# Patient Record
Sex: FEMALE | Race: BLACK OR AFRICAN AMERICAN | Employment: UNEMPLOYED | URBAN - METROPOLITAN AREA
[De-identification: names, ages, dates, MRNs, and addresses within clinical notes are randomized per-mention and may not be internally consistent; named-entity substitution may affect disease eponyms.]

---

## 2017-02-17 ENCOUNTER — APPOINTMENT (OUTPATIENT)
Dept: CT IMAGING | Age: 47
End: 2017-02-17
Attending: EMERGENCY MEDICINE
Payer: SELF-PAY

## 2017-02-17 ENCOUNTER — APPOINTMENT (OUTPATIENT)
Dept: GENERAL RADIOLOGY | Age: 47
End: 2017-02-17
Attending: EMERGENCY MEDICINE
Payer: SELF-PAY

## 2017-02-17 ENCOUNTER — HOSPITAL ENCOUNTER (EMERGENCY)
Age: 47
Discharge: HOME | End: 2017-02-17
Attending: EMERGENCY MEDICINE
Payer: SELF-PAY

## 2017-02-17 VITALS
DIASTOLIC BLOOD PRESSURE: 99 MMHG | TEMPERATURE: 98.5 F | SYSTOLIC BLOOD PRESSURE: 169 MMHG | RESPIRATION RATE: 16 BRPM | OXYGEN SATURATION: 99 % | HEART RATE: 80 BPM

## 2017-02-17 DIAGNOSIS — N28.9 RENAL INSUFFICIENCY: ICD-10-CM

## 2017-02-17 DIAGNOSIS — I10 HTN (HYPERTENSION): ICD-10-CM

## 2017-02-17 DIAGNOSIS — R80.9 PROTEINURIA: ICD-10-CM

## 2017-02-17 DIAGNOSIS — F11.10 HEROIN ABUSE (HCC): ICD-10-CM

## 2017-02-17 DIAGNOSIS — R51.9 NONINTRACTABLE HEADACHE, UNSPECIFIED CHRONICITY PATTERN, UNSPECIFIED HEADACHE TYPE: ICD-10-CM

## 2017-02-17 DIAGNOSIS — I10 ESSENTIAL HYPERTENSION: Primary | ICD-10-CM

## 2017-02-17 DIAGNOSIS — F14.10 COCAINE ABUSE (HCC): ICD-10-CM

## 2017-02-17 LAB
ALBUMIN SERPL BCP-MCNC: 3.1 G/DL (ref 3.4–5)
ALBUMIN/GLOB SERPL: 0.8 {RATIO} (ref 0.8–1.7)
ALP SERPL-CCNC: 112 U/L (ref 45–117)
ALT SERPL-CCNC: 23 U/L (ref 13–56)
AMPHET UR QL SCN: NEGATIVE
ANION GAP BLD CALC-SCNC: 6 MMOL/L (ref 3–18)
APPEARANCE UR: CLEAR
AST SERPL W P-5'-P-CCNC: 25 U/L (ref 15–37)
BACTERIA URNS QL MICRO: ABNORMAL /HPF
BARBITURATES UR QL SCN: NEGATIVE
BASOPHILS # BLD AUTO: 0 K/UL (ref 0–0.1)
BASOPHILS # BLD: 1 % (ref 0–2)
BENZODIAZ UR QL: NEGATIVE
BILIRUB SERPL-MCNC: 0.2 MG/DL (ref 0.2–1)
BILIRUB UR QL: NEGATIVE
BUN SERPL-MCNC: 22 MG/DL (ref 7–18)
BUN/CREAT SERPL: 16 (ref 12–20)
CALCIUM SERPL-MCNC: 8.4 MG/DL (ref 8.5–10.1)
CANNABINOIDS UR QL SCN: NEGATIVE
CHLORIDE SERPL-SCNC: 106 MMOL/L (ref 100–108)
CK MB CFR SERPL CALC: 1.1 % (ref 0–4)
CK MB SERPL-MCNC: 1.4 NG/ML (ref 0.5–3.6)
CK SERPL-CCNC: 124 U/L (ref 26–192)
CO2 SERPL-SCNC: 30 MMOL/L (ref 21–32)
COCAINE UR QL SCN: POSITIVE
COLOR UR: YELLOW
CREAT SERPL-MCNC: 1.41 MG/DL (ref 0.6–1.3)
DIFFERENTIAL METHOD BLD: ABNORMAL
EOSINOPHIL # BLD: 0.2 K/UL (ref 0–0.4)
EOSINOPHIL NFR BLD: 3 % (ref 0–5)
EPITH CASTS URNS QL MICRO: ABNORMAL /LPF (ref 0–5)
ERYTHROCYTE [DISTWIDTH] IN BLOOD BY AUTOMATED COUNT: 15.3 % (ref 11.6–14.5)
ETHANOL SERPL-MCNC: <3 MG/DL (ref 0–3)
GLOBULIN SER CALC-MCNC: 3.8 G/DL (ref 2–4)
GLUCOSE SERPL-MCNC: 94 MG/DL (ref 74–99)
GLUCOSE UR STRIP.AUTO-MCNC: NEGATIVE MG/DL
HCT VFR BLD AUTO: 29.6 % (ref 35–45)
HDSCOM,HDSCOM: ABNORMAL
HGB BLD-MCNC: 9.8 G/DL (ref 12–16)
HGB UR QL STRIP: NEGATIVE
KETONES UR QL STRIP.AUTO: NEGATIVE MG/DL
LEUKOCYTE ESTERASE UR QL STRIP.AUTO: NEGATIVE
LYMPHOCYTES # BLD AUTO: 22 % (ref 21–52)
LYMPHOCYTES # BLD: 1.1 K/UL (ref 0.9–3.6)
MAGNESIUM SERPL-MCNC: 1.9 MG/DL (ref 1.8–2.4)
MCH RBC QN AUTO: 29.3 PG (ref 24–34)
MCHC RBC AUTO-ENTMCNC: 33.1 G/DL (ref 31–37)
MCV RBC AUTO: 88.6 FL (ref 74–97)
METHADONE UR QL: NEGATIVE
MONOCYTES # BLD: 0.5 K/UL (ref 0.05–1.2)
MONOCYTES NFR BLD AUTO: 10 % (ref 3–10)
NEUTS SEG # BLD: 3.2 K/UL (ref 1.8–8)
NEUTS SEG NFR BLD AUTO: 64 % (ref 40–73)
NITRITE UR QL STRIP.AUTO: NEGATIVE
OPIATES UR QL: POSITIVE
PCP UR QL: NEGATIVE
PH UR STRIP: 7 [PH] (ref 5–8)
PLATELET # BLD AUTO: 144 K/UL (ref 135–420)
PMV BLD AUTO: 10 FL (ref 9.2–11.8)
POTASSIUM SERPL-SCNC: 3.2 MMOL/L (ref 3.5–5.5)
PROT SERPL-MCNC: 6.9 G/DL (ref 6.4–8.2)
PROT UR STRIP-MCNC: 30 MG/DL
RBC # BLD AUTO: 3.34 M/UL (ref 4.2–5.3)
RBC #/AREA URNS HPF: ABNORMAL /HPF (ref 0–5)
SODIUM SERPL-SCNC: 142 MMOL/L (ref 136–145)
SP GR UR REFRACTOMETRY: 1.01 (ref 1–1.03)
TROPONIN I SERPL-MCNC: <0.02 NG/ML (ref 0–0.04)
UROBILINOGEN UR QL STRIP.AUTO: 0.2 EU/DL (ref 0.2–1)
WBC # BLD AUTO: 5 K/UL (ref 4.6–13.2)
WBC URNS QL MICRO: ABNORMAL /HPF (ref 0–4)

## 2017-02-17 PROCEDURE — 74011250636 HC RX REV CODE- 250/636: Performed by: EMERGENCY MEDICINE

## 2017-02-17 PROCEDURE — 80307 DRUG TEST PRSMV CHEM ANLYZR: CPT | Performed by: EMERGENCY MEDICINE

## 2017-02-17 PROCEDURE — 74011250637 HC RX REV CODE- 250/637: Performed by: EMERGENCY MEDICINE

## 2017-02-17 PROCEDURE — 71010 XR CHEST PORT: CPT

## 2017-02-17 PROCEDURE — 82550 ASSAY OF CK (CPK): CPT | Performed by: EMERGENCY MEDICINE

## 2017-02-17 PROCEDURE — 83735 ASSAY OF MAGNESIUM: CPT | Performed by: EMERGENCY MEDICINE

## 2017-02-17 PROCEDURE — 70450 CT HEAD/BRAIN W/O DYE: CPT

## 2017-02-17 PROCEDURE — 96361 HYDRATE IV INFUSION ADD-ON: CPT

## 2017-02-17 PROCEDURE — 80053 COMPREHEN METABOLIC PANEL: CPT | Performed by: EMERGENCY MEDICINE

## 2017-02-17 PROCEDURE — 85025 COMPLETE CBC W/AUTO DIFF WBC: CPT | Performed by: EMERGENCY MEDICINE

## 2017-02-17 PROCEDURE — 99285 EMERGENCY DEPT VISIT HI MDM: CPT

## 2017-02-17 PROCEDURE — 81001 URINALYSIS AUTO W/SCOPE: CPT | Performed by: EMERGENCY MEDICINE

## 2017-02-17 PROCEDURE — 96374 THER/PROPH/DIAG INJ IV PUSH: CPT

## 2017-02-17 PROCEDURE — 93005 ELECTROCARDIOGRAM TRACING: CPT

## 2017-02-17 RX ORDER — HYDRALAZINE HYDROCHLORIDE 20 MG/ML
20 INJECTION INTRAMUSCULAR; INTRAVENOUS ONCE
Status: COMPLETED | OUTPATIENT
Start: 2017-02-17 | End: 2017-02-17

## 2017-02-17 RX ORDER — ACETAMINOPHEN 325 MG/1
325 TABLET ORAL
Status: COMPLETED | OUTPATIENT
Start: 2017-02-17 | End: 2017-02-17

## 2017-02-17 RX ORDER — AMLODIPINE BESYLATE 10 MG/1
10 TABLET ORAL
Status: COMPLETED | OUTPATIENT
Start: 2017-02-17 | End: 2017-02-17

## 2017-02-17 RX ORDER — HYDRALAZINE HYDROCHLORIDE 25 MG/1
25 TABLET, FILM COATED ORAL 3 TIMES DAILY
Qty: 90 TAB | Refills: 0 | Status: SHIPPED | OUTPATIENT
Start: 2017-02-17 | End: 2017-02-17

## 2017-02-17 RX ORDER — METOPROLOL TARTRATE 50 MG/1
50 TABLET ORAL 2 TIMES DAILY
COMMUNITY
End: 2017-08-16 | Stop reason: SDUPTHER

## 2017-02-17 RX ORDER — HYDRALAZINE HYDROCHLORIDE 25 MG/1
25 TABLET, FILM COATED ORAL
Status: COMPLETED | OUTPATIENT
Start: 2017-02-17 | End: 2017-02-17

## 2017-02-17 RX ORDER — HYDRALAZINE HYDROCHLORIDE 50 MG/1
50 TABLET, FILM COATED ORAL 3 TIMES DAILY
Qty: 90 TAB | Refills: 0 | Status: SHIPPED | OUTPATIENT
Start: 2017-02-17 | End: 2017-03-19

## 2017-02-17 RX ORDER — LISINOPRIL 20 MG/1
20 TABLET ORAL DAILY
COMMUNITY
End: 2017-08-16 | Stop reason: SDUPTHER

## 2017-02-17 RX ORDER — AMLODIPINE BESYLATE 10 MG/1
10 TABLET ORAL DAILY
Qty: 30 TAB | Refills: 0 | Status: SHIPPED | OUTPATIENT
Start: 2017-02-17 | End: 2017-03-19

## 2017-02-17 RX ORDER — AMLODIPINE BESYLATE 10 MG/1
10 TABLET ORAL DAILY
COMMUNITY
End: 2017-08-16 | Stop reason: SDUPTHER

## 2017-02-17 RX ORDER — ACETAMINOPHEN 325 MG/1
650 TABLET ORAL
Status: COMPLETED | OUTPATIENT
Start: 2017-02-17 | End: 2017-02-17

## 2017-02-17 RX ADMIN — HYDRALAZINE HYDROCHLORIDE 25 MG: 25 TABLET, FILM COATED ORAL at 06:14

## 2017-02-17 RX ADMIN — HYDRALAZINE HYDROCHLORIDE 25 MG: 25 TABLET, FILM COATED ORAL at 04:45

## 2017-02-17 RX ADMIN — HYDRALAZINE HYDROCHLORIDE 20 MG: 20 INJECTION INTRAMUSCULAR; INTRAVENOUS at 03:07

## 2017-02-17 RX ADMIN — ACETAMINOPHEN 325 MG: 325 TABLET, FILM COATED ORAL at 06:26

## 2017-02-17 RX ADMIN — AMLODIPINE BESYLATE 10 MG: 10 TABLET ORAL at 03:06

## 2017-02-17 RX ADMIN — SODIUM CHLORIDE 500 ML: 900 INJECTION, SOLUTION INTRAVENOUS at 03:06

## 2017-02-17 RX ADMIN — ACETAMINOPHEN 650 MG: 325 TABLET, FILM COATED ORAL at 03:07

## 2017-02-17 NOTE — ED NOTES
Bedside report received from [facility], pt lying on stretcher, pt c/o headache, no distress noted at this time.

## 2017-02-17 NOTE — ED PROVIDER NOTES
HPI Comments: 2:44 AM Honey JuddKarthik Vázquez is a 50 y.o. female with a history of hypertension, hypercholesteremia, and IV heroin and cocaine abuse who presents to the ED complaining of headache and \"just not feeling good\" starting tonight after using cocaine and heroin. She notes that she is followed by [facility] and is supposed to be on blood pressure medications but she has been out of her lisinopril and norvasc for the past 2 weeks. She denies any nausea or vomiting, no numbness or weakness, no neck or back pain, no change in vision or gait. She denies any history of CVA. She denies any associated chest pain or palpitations, no dyspnea. No other acute symptoms or complaints were noted. Past Medical History:   Diagnosis Date    Hypercholesterolemia     Hyperlipidemia     Hypertension     Other ill-defined conditions(799.89)      sinus    Other ill-defined conditions(799.89)      hypercholesteral       Past Surgical History:   Procedure Laterality Date    Hx hysterectomy           Family History:   Problem Relation Age of Onset    Diabetes Mother     Diabetes Brother        Social History     Social History    Marital status: SINGLE     Spouse name: N/A    Number of children: N/A    Years of education: N/A     Occupational History    Not on file. Social History Main Topics    Smoking status: Current Every Day Smoker     Packs/day: 0.50     Types: Cigarettes    Smokeless tobacco: Never Used    Alcohol use No    Drug use: 5.00 per week     Special: Heroin    Sexual activity: No     Other Topics Concern    Not on file     Social History Narrative         ALLERGIES: Review of patient's allergies indicates no known allergies. Review of Systems   Constitutional: Positive for fatigue. Negative for chills, diaphoresis and fever. HENT: Negative for congestion, nosebleeds and sore throat. Eyes: Negative for pain. Respiratory: Negative for cough and chest tightness.     Cardiovascular: Negative for chest pain and palpitations. Gastrointestinal: Negative for abdominal pain, constipation, diarrhea, nausea and vomiting. Endocrine: Negative. Genitourinary: Negative for dysuria and flank pain. Musculoskeletal: Negative for back pain, neck pain and neck stiffness. Skin: Negative for pallor, rash and wound. Allergic/Immunologic: Negative. Neurological: Positive for light-headedness and headaches. Negative for dizziness, seizures, syncope, weakness and numbness. Hematological: Does not bruise/bleed easily. Vitals:    02/17/17 0141 02/17/17 0200 02/17/17 0230   BP: (!) 197/117 (!) 190/114 (!) 193/115   Pulse: 76 73 76   Resp: 11 10 12   Temp: 98.5 °F (36.9 °C)     SpO2: 100% 99% 99%            Physical Exam   Constitutional: She is oriented to person, place, and time. She appears well-developed and well-nourished. No distress. Resting comfortably on stretcher   HENT:   Head: Normocephalic and atraumatic. MM moist   Eyes: Conjunctivae and EOM are normal. Pupils are equal, round, and reactive to light. No scleral icterus. Sclera clear bilaterally   Neck: Normal range of motion. Neck supple. No JVD present. Non-tender to palpation. No carotid bruits auscultated. Cardiovascular: Normal rate, regular rhythm and normal heart sounds. Exam reveals no gallop and no friction rub. No murmur heard. Pulmonary/Chest: Effort normal and breath sounds normal. No respiratory distress. She has no wheezes. She has no rales. She exhibits no tenderness. No crepitance with palpation   Abdominal: Soft. She exhibits no distension. There is no tenderness. Genitourinary:   Genitourinary Comments: No CVA tenderness   Musculoskeletal: She exhibits no edema or tenderness. Normal inspection of upper extremities. No edema noted to bilateral lower extremities   Lymphadenopathy:     She has no cervical adenopathy. Neurological: She is alert and oriented to person, place, and time.  No cranial nerve deficit. She exhibits normal muscle tone. Coordination normal.   Normal motor and sensation bilaterally to upper and lower extremities. Gait WNL. Skin: Skin is warm and dry. No rash noted. She is not diaphoretic. Psychiatric:   Normal mood and affect. Vitals reviewed. MDM  Number of Diagnoses or Management Options  Cocaine abuse:   Essential hypertension:   Heroin abuse:   Nonintractable headache, unspecified chronicity pattern, unspecified headache type:   Renal insufficiency:   Diagnosis management comments: Pt with general malaise, mild headache and HTN after cocaine/heroin use and blood pressure medication non-compliance. No acute distress appreciated. Neuro exam is WNL. Will check labs, EKG, CXR, CT head and treat HTN. Reassess. Pt with improved headache and BP. Steady gait in ED. Will d/c to home with PMD follow up. ACEI discontinued due to renal dysfunction, Beta blocker discontinued due to cocaine abuse. Amount and/or Complexity of Data Reviewed  Clinical lab tests: ordered  Tests in the radiology section of CPT®: ordered  Tests in the medicine section of CPT®: ordered and reviewed  Decide to obtain previous medical records or to obtain history from someone other than the patient: yes  Obtain history from someone other than the patient: yes  Independent visualization of images, tracings, or specimens: yes      ED Course       Procedures    EKG:  NSr, rate 77, normal axis, no significant ST-T abnormalities. CXR:  No acute pulmonary process     Scribe Attestation  Raffaele Ribeiro scribing for and in the presence of Jefferson Jay MD  02/17/17. Signed by: Kingston Doyle 02/17/17, 2:50 AM    Physician Attestation  I personally performed the services described in the documentation, reviewed the documentation, as recorded by the scribe in my presence, and it accurately and completely records my words and actions.     Jefferson Jya MD 02/17/17

## 2017-02-17 NOTE — ED NOTES
Pt arrived to the ED by EMS with co HA following cocaine and IV heroin abuse approx 1 hour ago. Pt states she does heroin IV daily but denies regular cocaine use. Hx HTN. Pt takes lisinopril 20, amlodipine 20, and metoprolol 50 BIDbut has been out of her medications x 1 month.

## 2017-02-17 NOTE — DISCHARGE INSTRUCTIONS
DASH Diet: Care Instructions  Your Care Instructions  The DASH diet is an eating plan that can help lower your blood pressure. DASH stands for Dietary Approaches to Stop Hypertension. Hypertension is high blood pressure. The DASH diet focuses on eating foods that are high in calcium, potassium, and magnesium. These nutrients can lower blood pressure. The foods that are highest in these nutrients are fruits, vegetables, low-fat dairy products, nuts, seeds, and legumes. But taking calcium, potassium, and magnesium supplements instead of eating foods that are high in those nutrients does not have the same effect. The DASH diet also includes whole grains, fish, and poultry. The DASH diet is one of several lifestyle changes your doctor may recommend to lower your high blood pressure. Your doctor may also want you to decrease the amount of sodium in your diet. Lowering sodium while following the DASH diet can lower blood pressure even further than just the DASH diet alone. Follow-up care is a key part of your treatment and safety. Be sure to make and go to all appointments, and call your doctor if you are having problems. It's also a good idea to know your test results and keep a list of the medicines you take. How can you care for yourself at home? Following the DASH diet  · Eat 4 to 5 servings of fruit each day. A serving is 1 medium-sized piece of fruit, ½ cup chopped or canned fruit, 1/4 cup dried fruit, or 4 ounces (½ cup) of fruit juice. Choose fruit more often than fruit juice. · Eat 4 to 5 servings of vegetables each day. A serving is 1 cup of lettuce or raw leafy vegetables, ½ cup of chopped or cooked vegetables, or 4 ounces (½ cup) of vegetable juice. Choose vegetables more often than vegetable juice. · Get 2 to 3 servings of low-fat and fat-free dairy each day. A serving is 8 ounces of milk, 1 cup of yogurt, or 1 ½ ounces of cheese. · Eat 6 to 8 servings of grains each day.  A serving is 1 slice of bread, 1 ounce of dry cereal, or ½ cup of cooked rice, pasta, or cooked cereal. Try to choose whole-grain products as much as possible. · Limit lean meat, poultry, and fish to 2 servings each day. A serving is 3 ounces, about the size of a deck of cards. · Eat 4 to 5 servings of nuts, seeds, and legumes (cooked dried beans, lentils, and split peas) each week. A serving is 1/3 cup of nuts, 2 tablespoons of seeds, or ½ cup of cooked beans or peas. · Limit fats and oils to 2 to 3 servings each day. A serving is 1 teaspoon of vegetable oil or 2 tablespoons of salad dressing. · Limit sweets and added sugars to 5 servings or less a week. A serving is 1 tablespoon jelly or jam, ½ cup sorbet, or 1 cup of lemonade. · Eat less than 2,300 milligrams (mg) of sodium a day. If you limit your sodium to 1,500 mg a day, you can lower your blood pressure even more. Tips for success  · Start small. Do not try to make dramatic changes to your diet all at once. You might feel that you are missing out on your favorite foods and then be more likely to not follow the plan. Make small changes, and stick with them. Once those changes become habit, add a few more changes. · Try some of the following:  ¨ Make it a goal to eat a fruit or vegetable at every meal and at snacks. This will make it easy to get the recommended amount of fruits and vegetables each day. ¨ Try yogurt topped with fruit and nuts for a snack or healthy dessert. ¨ Add lettuce, tomato, cucumber, and onion to sandwiches. ¨ Combine a ready-made pizza crust with low-fat mozzarella cheese and lots of vegetable toppings. Try using tomatoes, squash, spinach, broccoli, carrots, cauliflower, and onions. ¨ Have a variety of cut-up vegetables with a low-fat dip as an appetizer instead of chips and dip. ¨ Sprinkle sunflower seeds or chopped almonds over salads. Or try adding chopped walnuts or almonds to cooked vegetables. ¨ Try some vegetarian meals using beans and peas. Add garbanzo or kidney beans to salads. Make burritos and tacos with mashed dan beans or black beans. Where can you learn more? Go to http://esau-rosa.info/. Enter C613 in the search box to learn more about \"DASH Diet: Care Instructions. \"  Current as of: March 23, 2016  Content Version: 11.1  © 6611-4731 [employer]. Care instructions adapted under license by [employer] (which disclaims liability or warranty for this information). If you have questions about a medical condition or this instruction, always ask your healthcare professional. [place] [address] any warranty or liability for your use of this information. Cocaine Abuse: Care Instructions  Your Care Instructions  Using cocaine can cause physical and mental harm. It can increase your heart rate and blood pressure, which can lead to a heart attack and even death. It can raise your body temperature. You may have nausea, vomiting, and chills. If you smoke cocaine, the fumes can cause breathing problems. If you snort cocaine, it can damage your nasal passages. You may become shaky and restless. You also may see or hear things that are not there (hallucinations), or believe things that are not true. When the doctor treated you, he or she may have:  · Watched your symptoms or done tests to find out how much cocaine was in your body. · Treated you to control your heart rate, temperature, and blood pressure. · Given you oxygen to help you breathe. · Given you medicine to settle your thoughts and help keep you calm. The doctor has checked you carefully, but problems can develop later. If you notice any problems or new symptoms, get medical treatment right away. How can you care for yourself at home? · When you use cocaine regularly, your body and brain get used to it. This is called dependency. If you are dependent on this drug, you may have withdrawal symptoms when you stop using it. You may feel drowsy, have vivid dreams, or feel hungry, tired, or depressed. You may also feel confused and have trouble thinking clearly. To help get past these symptoms:  ¨ Get plenty of rest.  ¨ Drink lots of fluids. ¨ Stay active, but don't tire yourself. ¨ Eat a healthy diet. · Talk to your doctor about drug counseling programs that can help you stop using cocaine. When should you call for help? Call 911 anytime you think you may need emergency care. For example, call if:  · You have symptoms of a heart attack. These may include:  ¨ Chest pain or pressure, or a strange feeling in the chest.  ¨ Sweating. ¨ Shortness of breath. ¨ Nausea or vomiting. ¨ Pain, pressure, or a strange feeling in the back, neck, jaw, or upper belly or in one or both shoulders or arms. ¨ A fast or irregular heartbeat. After you call 911, the  may tell you to chew 1 adult-strength or 2 to 4 low-dose aspirin. Wait for an ambulance. Do not try to drive yourself. · You feel you cannot stop from hurting yourself or someone else. Call your doctor now or seek immediate medical care if:  · You have severe side effects from using cocaine. These may include problems with thinking, such as seeing things that aren't there or thinking that someone is trying to harm you (paranoia). · You have new or worse withdrawal symptoms. Watch closely for changes in your health, and be sure to contact your doctor if:  · You need more help or support to stop. Where can you learn more? Go to http://esau-rosa.info/. Enter G404 in the search box to learn more about \"Cocaine Abuse: Care Instructions. \"  Current as of: February 24, 2016  Content Version: 11.1  © 9033-0389 [employer]. Care instructions adapted under license by [employer] (which disclaims liability or warranty for this information).  If you have questions about a medical condition or this instruction, always ask your healthcare professional. [address] any warranty or liability for your use of this information. Opiate Overdose: Care Instructions  Your Care Instructions  You have had treatment to help your body recover from taking too much of an opiate. You are getting better, but you may not feel well for a while. It takes time for the medicine to leave your body. How long it takes to feel better depends on which drug you took and how much you took of it. Opiates include illegal drugs such as heroin, often called smack, junk, H, and ska. Opiates also include medicines that doctors prescribe to treat pain. These are medicines such as oxycodone, methadone, and buprenorphine. They are sometimes sold and used illegally. Taking too much of an opiate can be dangerous. It may cause:  · Trouble breathing. · Low blood pressure. · A low heart rate. · A coma. When the doctor treated you for the overdose, he or she may have:  · Watched your symptoms or done tests to find out what kind of drug you took. · Given you fluids. · Given you oxygen to help you breathe. · Given you a medicine called naloxone to help reverse the effects of the opiate. · Done several tests, including blood tests, to see how you're responding to treatment. The doctor also watched you carefully to make sure you were recovering safely. Follow-up care is a key part of your treatment and safety. Be sure to make and go to all appointments, and call your doctor if you are having problems. It's also a good idea to know your test results and keep a list of the medicines you take. How can you care for yourself at home? · If you take opiates regularly, your body gets used to them. This is called dependency. If you are dependent on this drug, you may have withdrawal symptoms when you stop taking it. These can include nausea, sweating, chills, diarrhea, stomach cramps, and muscle aches.  Withdrawal can last up to several weeks, depending on which drug you took. You may feel very ill, but you are probably not in medical danger. · Your doctor may give you medicine to help you feel better. To help get through withdrawal, you can also:  ¨ Get plenty of rest.  ¨ Drink plenty of fluids. ¨ Stay active, but don't tire yourself. ¨ Eat a healthy diet. · If you had a tube in your throat to help you breathe, you may have a sore throat or hoarseness that can last a few days. Sip liquids to help soothe your throat. · Do not drink alcohol or take illegal drugs. · Do not drive if you feel sleepy or groggy while you recover from your overdose. · Get help to stop using drugs. Talk to your doctor about drug counseling programs. When should you call for help? Call 911 anytime you think you may need emergency care. For example, call if:  · You feel you cannot stop from hurting yourself or someone else. Call your doctor now or seek immediate medical care if:  · You have new or worse withdrawal symptoms, such as:  ¨ Stomach cramps. ¨ Vomiting. ¨ Diarrhea. ¨ Muscle aches. ¨ Sweating. Watch closely for changes in your health, and be sure to contact your doctor if:  · You do not get better as expected. Where can you learn more? Go to http://esau-rosa.info/. Enter 872 [id number] in the search box to learn more about \"Opiate Overdose: Care Instructions. \"  Current as of: February 24, 2016  Content Version: 11.1  © 4809-4186 [employer]. Care instructions adapted under license by [facility] (which disclaims liability or warranty for this information). If you have questions about a medical condition or this instruction, always ask your healthcare professional. [place] [address] any warranty or liability for your use of this information. Headache: Care Instructions  Your Care Instructions    Headaches have many possible causes.  Most headaches aren't a sign of a more serious problem, and they will get better on their own. Home treatment may help you feel better faster. The doctor has checked you carefully, but problems can develop later. If you notice any problems or new symptoms, get medical treatment right away. Follow-up care is a key part of your treatment and safety. Be sure to make and go to all appointments, and call your doctor if you are having problems. It's also a good idea to know your test results and keep a list of the medicines you take. How can you care for yourself at home? · Do not drive if you have taken a prescription pain medicine. · Rest in a quiet, dark room until your headache is gone. Close your eyes and try to relax or go to sleep. Don't watch TV or read. · Put a cold, moist cloth or cold pack on the painful area for 10 to 20 minutes at a time. Put a thin cloth between the cold pack and your skin. · Use a warm, moist towel or a heating pad set on low to relax tight shoulder and neck muscles. · Have someone gently massage your neck and shoulders. · Take pain medicines exactly as directed. ¨ If the doctor gave you a prescription medicine for pain, take it as prescribed. ¨ If you are not taking a prescription pain medicine, ask your doctor if you can take an over-the-counter medicine. · Be careful not to take pain medicine more often than the instructions allow, because you may get worse or more frequent headaches when the medicine wears off. · Do not ignore new symptoms that occur with a headache, such as a fever, weakness or numbness, vision changes, or confusion. These may be signs of a more serious problem. To prevent headaches  · Keep a headache diary so you can figure out what triggers your headaches. Avoiding triggers may help you prevent headaches. Record when each headache began, how long it lasted, and what the pain was like (throbbing, aching, stabbing, or dull).  Write down any other symptoms you had with the headache, such as nausea, flashing lights or dark spots, or sensitivity to bright light or loud noise. Note if the headache occurred near your period. List anything that might have triggered the headache, such as certain foods (chocolate, cheese, wine) or odors, smoke, bright light, stress, or lack of sleep. · Find healthy ways to deal with stress. Headaches are most common during or right after stressful times. Take time to relax before and after you do something that has caused a headache in the past.  · Try to keep your muscles relaxed by keeping good posture. Check your jaw, face, neck, and shoulder muscles for tension, and try relaxing them. When sitting at a desk, change positions often, and stretch for 30 seconds each hour. · Get plenty of sleep and exercise. · Eat regularly and well. Long periods without food can trigger a headache. · Treat yourself to a massage. Some people find that regular massages are very helpful in relieving tension. · Limit caffeine by not drinking too much coffee, tea, or soda. But don't quit caffeine suddenly, because that can also give you headaches. · Reduce eyestrain from computers by blinking frequently and looking away from the computer screen every so often. Make sure you have proper eyewear and that your monitor is set up properly, about an arm's length away. · Seek help if you have depression or anxiety. Your headaches may be linked to these conditions. Treatment can both prevent headaches and help with symptoms of anxiety or depression. When should you call for help? Call 911 anytime you think you may need emergency care. For example, call if:  · You have signs of a stroke. These may include:  ¨ Sudden numbness, paralysis, or weakness in your face, arm, or leg, especially on only one side of your body. ¨ Sudden vision changes. ¨ Sudden trouble speaking. ¨ Sudden confusion or trouble understanding simple statements. ¨ Sudden problems with walking or balance.   ¨ A sudden, severe headache that is different from past headaches. Call your doctor now or seek immediate medical care if:  · You have a new or worse headache. · Your headache gets much worse. Where can you learn more? Go to http://esau-rosa.info/. Enter M271 in the search box to learn more about \"Headache: Care Instructions. \"  Current as of: February 19, 2016  Content Version: 11.1  © 8338-2762 [employer]. Care instructions adapted under license by [employer] (which disclaims liability or warranty for this information). If you have questions about a medical condition or this instruction, always ask your healthcare professional. [place] [address] any warranty or liability for your use of this information. High Blood Pressure: Care Instructions  Your Care Instructions  If your blood pressure is usually above 140/90, you have high blood pressure, or hypertension. That means the top number is 140 or higher or the bottom number is 90 or higher, or both. Despite what a lot of people think, high blood pressure usually doesn't cause headaches or make you feel dizzy or lightheaded. It usually has no symptoms. But it does increase your risk for heart attack, stroke, and kidney or eye damage. The higher your blood pressure, the more your risk increases. Your doctor will give you a goal for your blood pressure. Your goal will be based on your health and your age. An example of a goal is to keep your blood pressure below 140/90. Lifestyle changes, such as eating healthy and being active, are always important to help lower blood pressure. You might also take medicine to reach your blood pressure goal.  Follow-up care is a key part of your treatment and safety. Be sure to make and go to all appointments, and call your doctor if you are having problems. It's also a good idea to know your test results and keep a list of the medicines you take. How can you care for yourself at home?   Medical treatment  · If you stop taking your medicine, your blood pressure will go back up. You may take one or more types of medicine to lower your blood pressure. Be safe with medicines. Take your medicine exactly as prescribed. Call your doctor if you think you are having a problem with your medicine. · Talk to your doctor before you start taking aspirin every day. Aspirin can help certain people lower their risk of a heart attack or stroke. But taking aspirin isn't right for everyone, because it can cause serious bleeding. · See your doctor regularly. You may need to see the doctor more often at first or until your blood pressure comes down. · If you are taking blood pressure medicine, talk to your doctor before you take decongestants or anti-inflammatory medicine, such as ibuprofen. Some of these medicines can raise blood pressure. · Learn how to check your blood pressure at home. Lifestyle changes  · Stay at a healthy weight. This is especially important if you put on weight around the waist. Losing even 10 pounds can help you lower your blood pressure. · If your doctor recommends it, get more exercise. Walking is a good choice. Bit by bit, increase the amount you walk every day. Try for at least 30 minutes on most days of the week. You also may want to swim, bike, or do other activities. · Avoid or limit alcohol. Talk to your doctor about whether you can drink any alcohol. · Try to limit how much sodium you eat to less than 2,300 milligrams (mg) a day. Your doctor may ask you to try to eat less than 1,500 mg a day. · Eat plenty of fruits (such as bananas and oranges), vegetables, legumes, whole grains, and low-fat dairy products. · Lower the amount of saturated fat in your diet. Saturated fat is found in animal products such as milk, cheese, and meat. Limiting these foods may help you lose weight and also lower your risk for heart disease. · Do not smoke. Smoking increases your risk for heart attack and stroke.  If you need help quitting, talk to your doctor about stop-smoking programs and medicines. These can increase your chances of quitting for good. When should you call for help? Call 911 anytime you think you may need emergency care. This may mean having symptoms that suggest that your blood pressure is causing a serious heart or blood vessel problem. Your blood pressure may be over 180/110. For example, call 911 if:  · You have symptoms of a heart attack. These may include:  ¨ Chest pain or pressure, or a strange feeling in the chest.  ¨ Sweating. ¨ Shortness of breath. ¨ Nausea or vomiting. ¨ Pain, pressure, or a strange feeling in the back, neck, jaw, or upper belly or in one or both shoulders or arms. ¨ Lightheadedness or sudden weakness. ¨ A fast or irregular heartbeat. · You have symptoms of a stroke. These may include:  ¨ Sudden numbness, tingling, weakness, or loss of movement in your face, arm, or leg, especially on only one side of your body. ¨ Sudden vision changes. ¨ Sudden trouble speaking. ¨ Sudden confusion or trouble understanding simple statements. ¨ Sudden problems with walking or balance. ¨ A sudden, severe headache that is different from past headaches. · You have severe back or belly pain. Do not wait until your blood pressure comes down on its own. Get help right away. Call your doctor now or seek immediate care if:  · Your blood pressure is much higher than normal (such as 180/110 or higher), but you don't have symptoms. · You think high blood pressure is causing symptoms, such as:  ¨ Severe headache. ¨ Blurry vision. Watch closely for changes in your health, and be sure to contact your doctor if:  · Your blood pressure measures 140/90 or higher at least 2 times. That means the top number is 140 or higher or the bottom number is 90 or higher, or both. · You think you may be having side effects from your blood pressure medicine.   · Your blood pressure is usually normal, but it goes above normal at least 2 times. Where can you learn more? Go to http://esau-rosa.info/. Enter C633 in the search box to learn more about \"High Blood Pressure: Care Instructions. \"  Current as of: August 8, 2016  Content Version: 11.1  © 3984-9113 [employer]. Care instructions adapted under license by [facility] (which disclaims liability or warranty for this information). If you have questions about a medical condition or this instruction, always ask your healthcare professional. [address] any warranty or liability for your use of this information.

## 2017-02-17 NOTE — Clinical Note
Have your blood pressure rechecked by a primary care doctor this week. Do not use cocaine or heroin. Take tylenol as needed for headache, follow label instructions for dosing guidelines.

## 2017-02-18 LAB
ATRIAL RATE: 77 BPM
CALCULATED P AXIS, ECG09: 67 DEGREES
CALCULATED R AXIS, ECG10: 48 DEGREES
CALCULATED T AXIS, ECG11: 69 DEGREES
DIAGNOSIS, 93000: NORMAL
P-R INTERVAL, ECG05: 184 MS
Q-T INTERVAL, ECG07: 414 MS
QRS DURATION, ECG06: 86 MS
QTC CALCULATION (BEZET), ECG08: 468 MS
VENTRICULAR RATE, ECG03: 77 BPM

## 2017-07-18 ENCOUNTER — HOSPITAL ENCOUNTER (EMERGENCY)
Age: 47
Discharge: PSYCHIATRIC OR SUBSTANCE USE FACILITY | End: 2017-07-19
Attending: EMERGENCY MEDICINE
Payer: SELF-PAY

## 2017-07-18 DIAGNOSIS — R45.851 SUICIDAL IDEATION: Primary | ICD-10-CM

## 2017-07-18 DIAGNOSIS — R03.0 ELEVATED BLOOD PRESSURE READING: ICD-10-CM

## 2017-07-18 DIAGNOSIS — F19.90 DRUG USE: ICD-10-CM

## 2017-07-18 LAB
ALBUMIN SERPL BCP-MCNC: 3.1 G/DL (ref 3.4–5)
ALBUMIN/GLOB SERPL: 0.6 {RATIO} (ref 0.8–1.7)
ALP SERPL-CCNC: 117 U/L (ref 45–117)
ALT SERPL-CCNC: 28 U/L (ref 13–56)
AMPHET UR QL SCN: NEGATIVE
ANION GAP BLD CALC-SCNC: 6 MMOL/L (ref 3–18)
AST SERPL W P-5'-P-CCNC: 28 U/L (ref 15–37)
BARBITURATES UR QL SCN: NEGATIVE
BASOPHILS # BLD AUTO: 0 K/UL (ref 0–0.06)
BASOPHILS # BLD: 1 % (ref 0–2)
BENZODIAZ UR QL: NEGATIVE
BILIRUB SERPL-MCNC: 0.1 MG/DL (ref 0.2–1)
BUN SERPL-MCNC: 17 MG/DL (ref 7–18)
BUN/CREAT SERPL: 11 (ref 12–20)
CALCIUM SERPL-MCNC: 9.1 MG/DL (ref 8.5–10.1)
CANNABINOIDS UR QL SCN: NEGATIVE
CHLORIDE SERPL-SCNC: 102 MMOL/L (ref 100–108)
CO2 SERPL-SCNC: 31 MMOL/L (ref 21–32)
COCAINE UR QL SCN: POSITIVE
CREAT SERPL-MCNC: 1.54 MG/DL (ref 0.6–1.3)
DIFFERENTIAL METHOD BLD: ABNORMAL
EOSINOPHIL # BLD: 0 K/UL (ref 0–0.4)
EOSINOPHIL NFR BLD: 0 % (ref 0–5)
ERYTHROCYTE [DISTWIDTH] IN BLOOD BY AUTOMATED COUNT: 14.4 % (ref 11.6–14.5)
ETHANOL SERPL-MCNC: <3 MG/DL (ref 0–3)
GLOBULIN SER CALC-MCNC: 5 G/DL (ref 2–4)
GLUCOSE SERPL-MCNC: 136 MG/DL (ref 74–99)
HCT VFR BLD AUTO: 29.6 % (ref 35–45)
HDSCOM,HDSCOM: ABNORMAL
HGB BLD-MCNC: 9.7 G/DL (ref 12–16)
LYMPHOCYTES # BLD AUTO: 26 % (ref 21–52)
LYMPHOCYTES # BLD: 0.9 K/UL (ref 0.9–3.6)
MCH RBC QN AUTO: 27.8 PG (ref 24–34)
MCHC RBC AUTO-ENTMCNC: 32.8 G/DL (ref 31–37)
MCV RBC AUTO: 84.8 FL (ref 74–97)
METHADONE UR QL: NEGATIVE
MONOCYTES # BLD: 0.2 K/UL (ref 0.05–1.2)
MONOCYTES NFR BLD AUTO: 7 % (ref 3–10)
NEUTS SEG # BLD: 2.2 K/UL (ref 1.8–8)
NEUTS SEG NFR BLD AUTO: 66 % (ref 40–73)
OPIATES UR QL: POSITIVE
PCP UR QL: NEGATIVE
PLATELET # BLD AUTO: 163 K/UL (ref 135–420)
PMV BLD AUTO: 9.3 FL (ref 9.2–11.8)
POTASSIUM SERPL-SCNC: 3.1 MMOL/L (ref 3.5–5.5)
PROT SERPL-MCNC: 8.1 G/DL (ref 6.4–8.2)
RBC # BLD AUTO: 3.49 M/UL (ref 4.2–5.3)
SODIUM SERPL-SCNC: 139 MMOL/L (ref 136–145)
WBC # BLD AUTO: 3.4 K/UL (ref 4.6–13.2)

## 2017-07-18 PROCEDURE — 80307 DRUG TEST PRSMV CHEM ANLYZR: CPT | Performed by: EMERGENCY MEDICINE

## 2017-07-18 PROCEDURE — 74011250637 HC RX REV CODE- 250/637: Performed by: EMERGENCY MEDICINE

## 2017-07-18 PROCEDURE — 80053 COMPREHEN METABOLIC PANEL: CPT | Performed by: EMERGENCY MEDICINE

## 2017-07-18 PROCEDURE — 85025 COMPLETE CBC W/AUTO DIFF WBC: CPT | Performed by: EMERGENCY MEDICINE

## 2017-07-18 PROCEDURE — 99284 EMERGENCY DEPT VISIT MOD MDM: CPT

## 2017-07-18 RX ORDER — AMLODIPINE BESYLATE 10 MG/1
10 TABLET ORAL
Status: COMPLETED | OUTPATIENT
Start: 2017-07-18 | End: 2017-07-18

## 2017-07-18 RX ORDER — LOPERAMIDE HYDROCHLORIDE 2 MG/1
4 CAPSULE ORAL ONCE
Status: COMPLETED | OUTPATIENT
Start: 2017-07-18 | End: 2017-07-18

## 2017-07-18 RX ORDER — ONDANSETRON 4 MG/1
4 TABLET, ORALLY DISINTEGRATING ORAL
Status: COMPLETED | OUTPATIENT
Start: 2017-07-18 | End: 2017-07-18

## 2017-07-18 RX ORDER — CLONIDINE 0.2 MG/24H
1 PATCH, EXTENDED RELEASE TRANSDERMAL
Status: DISCONTINUED | OUTPATIENT
Start: 2017-07-18 | End: 2017-07-19 | Stop reason: HOSPADM

## 2017-07-18 RX ORDER — DIAZEPAM 5 MG/1
5 TABLET ORAL
Status: COMPLETED | OUTPATIENT
Start: 2017-07-18 | End: 2017-07-18

## 2017-07-18 RX ADMIN — AMLODIPINE BESYLATE 10 MG: 10 TABLET ORAL at 14:17

## 2017-07-18 RX ADMIN — ONDANSETRON 4 MG: 4 TABLET, ORALLY DISINTEGRATING ORAL at 14:17

## 2017-07-18 RX ADMIN — LOPERAMIDE HYDROCHLORIDE 4 MG: 2 CAPSULE ORAL at 14:17

## 2017-07-18 RX ADMIN — DIAZEPAM 5 MG: 5 TABLET ORAL at 18:36

## 2017-07-18 NOTE — ED NOTES
Bedside report received from Fili Jimenez, [facility]. Pt observed resting on stretcher in comfortable position with eyes closed. Observed rise and fall of chest.  Will continue to monitor.

## 2017-07-18 NOTE — BSMART NOTE
Comprehensive Assessment Form Part 1    Section I - Dis[facility]    [facility] I spoke with Mikhail Srinivasan about coming to evaluate this patient for possible crisis stabilization. Section II - Integrated Summary       This is a 50year old female with a history of HTN, Hypercholesterolemia and Hyperlipidemia who presented in the ED with complaints of depression and suicidal ideations. She said \"I'm tired of living. \" States she relapsed on drugs back in 2012 or 2013 after she was charged with child abuse and her child was taken away from her. She said she woke up this morning feeling  depressed and tired. She has been abusing heroin and cocaine daily. States she has never tried to hurt herself in the past but is now having thoughts of overdosing. She claims that she went to the [facility] in the past and was sent to [facility] for a 30 day program. Sleep and appetite disturbance. She denies having thoughts of wanting to harm others. When asked about hallucinations, she said \"sometimes I think I hear people calling my name. \" She is unemployed and lives with a cousin.                         Lizandro Bennett RN

## 2017-07-18 NOTE — ED NOTES
I performed a brief evaluation, including history and physical, of the patient here in triage and I have determined that pt will need further treatment and evaluation from the main side ER physician. I have placed initial orders to help in expediting patients care.      July 18, 2017 at 12:27 PM - [place], MD        Visit Vitals    BP (!) 191/123 (BP 1 Location: Left arm, BP Patient Position: At rest)    Pulse 86    Temp 98.8 °F (37.1 °C)    Resp 18    Ht 5' 8\" (1.727 m)    Wt 59 kg (130 lb)    SpO2 100%    BMI 19.77 kg/m2

## 2017-07-18 NOTE — ED PROVIDER NOTES
HPI Comments: 1:40 PM  Abigail Harvey is a 50 y.o. female presenting to the ED C/O SI (denies HI) X 1 month. States she has had similar symptoms in the past. Pt reports feeling depressed but is not currently taking medications for depression. Associated sxs include restless feeling, cold sweats, diarrhea (onset this AM), and mild nausea. Reports drug use yesterday including heroin and cocaine. Does endorse ivdu. Pt denies fever, cough, CP, HI or hallucinations and any other symptoms or complaints. Patient is a 50 y.o. female presenting with mental health disorder. The history is provided by the patient. Mental Health Problem   The primary symptoms include dysphoric mood (depression). The primary symptoms do not include delusions or hallucinations. Episode onset: X 1 month. Additional symptoms of the illness do not include no headaches or no abdominal pain. Past Medical History:   Diagnosis Date    Hypercholesterolemia     Hyperlipidemia     Hypertension     Other ill-defined conditions     sinus    Other ill-defined conditions     hypercholesteral       Past Surgical History:   Procedure Laterality Date    HX HYSTERECTOMY           Family History:   Problem Relation Age of Onset    Diabetes Mother     Diabetes Brother        Social History     Social History    Marital status: SINGLE     Spouse name: N/A    Number of children: N/A    Years of education: N/A     Occupational History    Not on file. Social History Main Topics    Smoking status: Current Every Day Smoker     Packs/day: 0.50     Types: Cigarettes    Smokeless tobacco: Never Used    Alcohol use No    Drug use: 5.00 per week     Special: Heroin    Sexual activity: No     Other Topics Concern    Not on file     Social History Narrative         ALLERGIES: Review of patient's allergies indicates no known allergies. Review of Systems   Constitutional: Negative for fever. HENT: Negative for congestion.     Eyes: Negative for visual disturbance. Respiratory: Negative for cough and shortness of breath. Cardiovascular: Negative for chest pain and leg swelling. Gastrointestinal: Positive for diarrhea. Negative for abdominal pain, nausea and vomiting. Genitourinary: Negative for dysuria. Musculoskeletal: Negative. Neurological: Negative for speech difficulty and headaches. Psychiatric/Behavioral: Positive for dysphoric mood (depression) and suicidal ideas. Negative for hallucinations. All other systems reviewed and are negative. Vitals:    07/18/17 1217 07/18/17 1417   BP: (!) 191/123 190/88   Pulse: 86 76   Resp: 18    Temp: 98.8 °F (37.1 °C)    SpO2: 100%    Weight: 59 kg (130 lb)    Height: 5' 8\" (1.727 m)             Physical Exam   Constitutional: She is oriented to person, place, and time. Uncomfortable appearing   HENT:   Head: Atraumatic. Eyes: Conjunctivae are normal.   Neck: Normal range of motion. Neck supple. Cardiovascular: Normal rate, regular rhythm and normal heart sounds. Pulmonary/Chest: Effort normal and breath sounds normal. She exhibits no tenderness. Abdominal: Soft. She exhibits no distension. There is no tenderness. There is no rebound and no guarding. Hyperactive bowel sounds   Musculoskeletal: Normal range of motion. She exhibits no edema or tenderness. Neurological: She is alert and oriented to person, place, and time. No cranial nerve deficit. Skin: Skin is warm and dry. Psychiatric: Her speech is normal. She exhibits a depressed mood. She expresses suicidal ideation. She expresses no homicidal ideation. Nursing note and vitals reviewed. MDM  Number of Diagnoses or Management Options  Drug use:   Suicidal ideation:   Diagnosis management comments: Ana Daniel is a 50 y.o. female presenting for evaluation of SI and symptoms of opiate withdrawal. Medically cleared, will treat symptoms and consult crisis.  Will continue to monitor closely in ED for any change in symptoms or exam.     asymtomatic htn, BP down with clonidine patch, will continue to monitor. Amount and/or Complexity of Data Reviewed  Clinical lab tests: ordered and reviewed      ED Course       Procedures    Vitals:  Patient Vitals for the past 12 hrs:   Temp Pulse Resp BP SpO2   07/18/17 1417 - 76 - 190/88 -   07/18/17 1217 98.8 °F (37.1 °C) 86 18 (!) 191/123 100 %         Progress notes, Consult notes or additional Procedure notes:   I have spoken with Scottie Mueller, with crisis about patient. She has evaluated pt and has consulted [facility]      Patient's care signed over to Dr Jed Garcia pending final disposition. Disposition:  Diagnosis:   1. Suicidal ideation    2. Drug use        Disposition: pending    Scribe Attestation  Real Antunez scribing for and in the presence of Jamal Hi MD (07/18/17)      Physician Attestation  I personally performed the services described in this documentation, reviewed and edited the documentation which was dictated to the scribe in my presence, and it accurately records my words and actions.     Jamal Hi MD (07/18/17)      Signed by: Kingston Ashley, July 18, 2017 at 1:57 PM

## 2017-07-18 NOTE — ED TRIAGE NOTES
Pt c/o using heroin yesterday & c/o SI & denies HI. Pt has a plan - she states she is going to overdose.

## 2017-07-19 VITALS
HEIGHT: 68 IN | RESPIRATION RATE: 14 BRPM | TEMPERATURE: 99.2 F | OXYGEN SATURATION: 100 % | SYSTOLIC BLOOD PRESSURE: 141 MMHG | DIASTOLIC BLOOD PRESSURE: 101 MMHG | WEIGHT: 130 LBS | BODY MASS INDEX: 19.7 KG/M2 | HEART RATE: 72 BPM

## 2017-07-19 PROCEDURE — 74011250637 HC RX REV CODE- 250/637

## 2017-07-19 PROCEDURE — 74011250637 HC RX REV CODE- 250/637: Performed by: EMERGENCY MEDICINE

## 2017-07-19 RX ORDER — LISINOPRIL 20 MG/1
TABLET ORAL
Status: COMPLETED
Start: 2017-07-19 | End: 2017-07-19

## 2017-07-19 RX ORDER — METOPROLOL TARTRATE 50 MG/1
50 TABLET ORAL
Status: COMPLETED | OUTPATIENT
Start: 2017-07-19 | End: 2017-07-19

## 2017-07-19 RX ORDER — LISINOPRIL 20 MG/1
20 TABLET ORAL
Status: COMPLETED | OUTPATIENT
Start: 2017-07-19 | End: 2017-07-19

## 2017-07-19 RX ORDER — AMLODIPINE BESYLATE 10 MG/1
10 TABLET ORAL
Status: COMPLETED | OUTPATIENT
Start: 2017-07-19 | End: 2017-07-19

## 2017-07-19 RX ORDER — METOPROLOL TARTRATE 50 MG/1
TABLET ORAL
Status: COMPLETED
Start: 2017-07-19 | End: 2017-07-19

## 2017-07-19 RX ADMIN — AMLODIPINE BESYLATE 10 MG: 10 TABLET ORAL at 09:40

## 2017-07-19 RX ADMIN — METOPROLOL TARTRATE: 50 TABLET ORAL at 03:43

## 2017-07-19 RX ADMIN — METOPROLOL TARTRATE 50 MG: 50 TABLET ORAL at 09:40

## 2017-07-19 RX ADMIN — LISINOPRIL: 20 TABLET ORAL at 03:43

## 2017-07-19 RX ADMIN — LISINOPRIL 20 MG: 20 TABLET ORAL at 09:40

## 2017-07-19 NOTE — ED NOTES
Bedside shift change report given to Og Schultz RN (oncoming nurse) by Mala Cortez RN (offgoing nurse). Report included the following information SBAR, Kardex, ED Summary, [facility] and Recent Results.

## 2017-07-19 NOTE — BSMART NOTE
Crisis Note: Patient has been seen by Benoit Escalante from [facility]. [facility] and [facility] has no [employer]. [facility] is reviewing the information for possible placement.

## 2017-07-19 NOTE — ED NOTES
ADDENDUM      Patient's care was signed over to me at 0700 on 7/19/17. Patient's care signed over to me pending placement. Pt resting comfortably without complaints. Pt accepted to [facility] at 1500. Pt agrees with plan. IMPRESSION:   1. Suicidal ideation    2. Drug use    3.  Elevated blood pressure reading        DISPO: transferred to CSU in stable condition    Audie Pedroza MD

## 2017-07-19 NOTE — ED NOTES
8:58 PM :Pt care assumed from Dr. Mauricio Peralta , ED provider. Pt complaint(s), current treatment plan, progression and available diagnostic results have been discussed thoroughly. Rounding occurred: yes  Intended Disposition: Home   Pending diagnostic reports and/or labs (please list): CSU placement    Vitals:  Patient Vitals for the past 12 hrs:   Temp Pulse Resp BP SpO2   07/18/17 1417 - 76 - 190/88 -   07/18/17 1217 98.8 °F (37.1 °C) 86 18 (!) 191/123 100 %       Medications Ordered:  Medications   cloNIDine (CATAPRES) 0.2 mg/24 hr patch 1 Patch (1 Patch TransDERmal Apply Patch 7/18/17 1417)   amLODIPine (NORVASC) tablet 10 mg (10 mg Oral Given 7/18/17 1417)   loperamide (IMODIUM) capsule 4 mg (4 mg Oral Given 7/18/17 1417)   ondansetron (ZOFRAN ODT) tablet 4 mg (4 mg Oral Given 7/18/17 1417)   diazePAM (VALIUM) tablet 5 mg (5 mg Oral Given 7/18/17 1836)         Lab Findings:  Recent Results (from the past 12 hour(s))   DRUG SCREEN, URINE    Collection Time: 07/18/17 12:55 PM   Result Value Ref Range    BENZODIAZEPINE NEGATIVE  NEG      BARBITURATES NEGATIVE  NEG      THC (TH-CANNABINOL) NEGATIVE  NEG      OPIATES POSITIVE (A) NEG      PCP(PHENCYCLIDINE) NEGATIVE  NEG      COCAINE POSITIVE (A) NEG      AMPHETAMINES NEGATIVE  NEG      METHADONE NEGATIVE  NEG      HDSCOM (NOTE)    CBC WITH AUTOMATED DIFF    Collection Time: 07/18/17  1:10 PM   Result Value Ref Range    WBC 3.4 (L) 4.6 - 13.2 K/uL    RBC 3.49 (L) 4.20 - 5.30 M/uL    HGB 9.7 (L) 12.0 - 16.0 g/dL    HCT 29.6 (L) 35.0 - 45.0 %    MCV 84.8 74.0 - 97.0 FL    MCH 27.8 24.0 - 34.0 PG    MCHC 32.8 31.0 - 37.0 g/dL    RDW 14.4 11.6 - 14.5 %    PLATELET 711 508 - 302 K/uL    MPV 9.3 9.2 - 11.8 FL    NEUTROPHILS 66 40 - 73 %    LYMPHOCYTES 26 21 - 52 %    MONOCYTES 7 3 - 10 %    EOSINOPHILS 0 0 - 5 %    BASOPHILS 1 0 - 2 %    ABS. NEUTROPHILS 2.2 1.8 - 8.0 K/UL    ABS. LYMPHOCYTES 0.9 0.9 - 3.6 K/UL    ABS. MONOCYTES 0.2 0.05 - 1.2 K/UL    ABS.  EOSINOPHILS 0.0 0.0 - 0.4 K/UL    ABS. BASOPHILS 0.0 0.0 - 0.06 K/UL    DF AUTOMATED     METABOLIC PANEL, COMPREHENSIVE    Collection Time: 07/18/17  1:10 PM   Result Value Ref Range    Sodium 139 136 - 145 mmol/L    Potassium 3.1 (L) 3.5 - 5.5 mmol/L    Chloride 102 100 - 108 mmol/L    CO2 31 21 - 32 mmol/L    Anion gap 6 3.0 - 18 mmol/L    Glucose 136 (H) 74 - 99 mg/dL    BUN 17 7.0 - 18 MG/DL    Creatinine 1.54 (H) 0.6 - 1.3 MG/DL    BUN/Creatinine ratio 11 (L) 12 - 20      GFR est AA 43 (L) >60 ml/min/1.73m2    GFR est non-AA 36 (L) >60 ml/min/1.73m2    Calcium 9.1 8.5 - 10.1 MG/DL    Bilirubin, total 0.1 (L) 0.2 - 1.0 MG/DL    ALT (SGPT) 28 13 - 56 U/L    AST (SGOT) 28 15 - 37 U/L    Alk. phosphatase 117 45 - 117 U/L    Protein, total 8.1 6.4 - 8.2 g/dL    Albumin 3.1 (L) 3.4 - 5.0 g/dL    Globulin 5.0 (H) 2.0 - 4.0 g/dL    A-G Ratio 0.6 (L) 0.8 - 1.7     ETHYL ALCOHOL    Collection Time: 07/18/17  1:10 PM   Result Value Ref Range    ALCOHOL(ETHYL),SERUM <3 0 - 3 MG/DL     Progress notes, consult notes, or additional procedure notes:  Patient was evaluated by [facility] and will be referred for CSU bed placement. Patient care transferred to Dr. Jeremy Drew pending placement. Diagnosis:   1. Suicidal ideation    2. Drug use        Disposition: Pending    Follow-up Information     None          Patient's Medications   Start Taking    No medications on file   Continue Taking    AMLODIPINE (NORVASC) 10 MG TABLET    Take 10 mg by mouth daily. LISINOPRIL (PRINIVIL, ZESTRIL) 20 MG TABLET    Take 20 mg by mouth daily. METOPROLOL TARTRATE (LOPRESSOR) 50 MG TABLET    Take 50 mg by mouth two (2) times a day.    These Medications have changed    No medications on file   Stop Taking    No medications on file       Scribe Attestation      Maicol Marin acting as a scribe for and in the presence of Rob De Anda MD      July 18, 2017 at 8:59 PM       Provider Attestation:      I personally performed the services described in the documentation, reviewed the documentation, as recorded by the scribe in my presence, and it accurately and completely records my words and actions.      Marcelo Darling MD      Signed by: Kingston Hull, July 18, 2017 at 8:59 PM

## 2017-07-19 NOTE — BSMART NOTE
Shanique Escalera of [employer] [facility] has informed that patient has been accepted to the [facility].   Admission is for today July 19 @ 1500  Accepting is Dr. Kimi Richter  Report # [id number]

## 2017-07-19 NOTE — ED NOTES
7:24 PM :Pt care assumed from Dr. Harsha March , ED provider. Pt complaint(s), current treatment plan, progression and available diagnostic results have been discussed thoroughly. Rounding occurred: yes  Intended Disposition: ADMIT   Pending diagnostic reports and/or labs (please list): [facility] evaluation. Vitals:  Patient Vitals for the past 12 hrs:   Temp Pulse Resp BP SpO2   07/19/17 0343 - 80 - (!) 191/118 -   07/18/17 2303 98.6 °F (37 °C) 77 12 (!) 172/106 98 %       Medications Ordered:  Medications   cloNIDine (CATAPRES) 0.2 mg/24 hr patch 1 Patch (1 Patch TransDERmal Apply Patch 7/18/17 1417)   amLODIPine (NORVASC) tablet 10 mg (10 mg Oral Given 7/18/17 1417)   loperamide (IMODIUM) capsule 4 mg (4 mg Oral Given 7/18/17 1417)   ondansetron (ZOFRAN ODT) tablet 4 mg (4 mg Oral Given 7/18/17 1417)   diazePAM (VALIUM) tablet 5 mg (5 mg Oral Given 7/18/17 1836)   metoprolol tartrate (LOPRESSOR) 50 mg tablet (  Given 7/19/17 0343)   lisinopril (PRINIVIL, ZESTRIL) 20 mg tablet (  Given 7/19/17 0343)       Diagnosis:   1. Suicidal ideation    2. Drug use    3. Elevated blood pressure reading        Disposition: 6:26 AM : Pt care transferred to Dr. Harsha March  ,ED provider. History of patient complaint(s), available diagnostic reports and current treatment plan has been discussed thoroughly. Bedside rounding on patient occured : yes . Intended disposition of patient : ADMIT/transfer  Pending diagnostics reports and/or labs (please list): CSU placement        Follow-up Information     None          Patient's Medications   Start Taking    No medications on file   Continue Taking    AMLODIPINE (NORVASC) 10 MG TABLET    Take 10 mg by mouth daily. LISINOPRIL (PRINIVIL, ZESTRIL) 20 MG TABLET    Take 20 mg by mouth daily. METOPROLOL TARTRATE (LOPRESSOR) 50 MG TABLET    Take 50 mg by mouth two (2) times a day.    These Medications have changed    No medications on file   Stop Taking    No medications on file       Scribe Attestation      dEie Do acting as a scribe for and in the presence of Janet Olmos MD      July 19, 2017 at 6:26 AM       Provider Attestation:      I personally performed the services described in the documentation, reviewed the documentation, as recorded by the scribe in my presence, and it accurately and completely records my words and actions.      Janet Olmos MD      Signed by: Kingston Lira, July 19, 2017 at 6:26 AM

## 2017-08-16 ENCOUNTER — OFFICE VISIT (OUTPATIENT)
Dept: FAMILY MEDICINE CLINIC | Age: 47
End: 2017-08-16

## 2017-08-16 VITALS
RESPIRATION RATE: 16 BRPM | BODY MASS INDEX: 19.55 KG/M2 | WEIGHT: 129 LBS | HEART RATE: 68 BPM | HEIGHT: 68 IN | SYSTOLIC BLOOD PRESSURE: 153 MMHG | TEMPERATURE: 98.7 F | OXYGEN SATURATION: 100 % | DIASTOLIC BLOOD PRESSURE: 90 MMHG

## 2017-08-16 DIAGNOSIS — F17.200 SMOKER: ICD-10-CM

## 2017-08-16 DIAGNOSIS — D64.9 ANEMIA, UNSPECIFIED TYPE: ICD-10-CM

## 2017-08-16 DIAGNOSIS — F11.91 HISTORY OF HEROIN USE: ICD-10-CM

## 2017-08-16 DIAGNOSIS — D36.7 DERMOID CYST OF LEG, LEFT: ICD-10-CM

## 2017-08-16 DIAGNOSIS — I10 ESSENTIAL HYPERTENSION: Primary | ICD-10-CM

## 2017-08-16 RX ORDER — METOPROLOL TARTRATE 50 MG/1
50 TABLET ORAL 2 TIMES DAILY
Qty: 60 TAB | Refills: 3 | Status: SHIPPED | OUTPATIENT
Start: 2017-08-16 | End: 2018-10-18 | Stop reason: SDUPTHER

## 2017-08-16 RX ORDER — CEPHALEXIN 250 MG/1
250 CAPSULE ORAL 3 TIMES DAILY
Qty: 42 CAP | Refills: 0 | Status: SHIPPED | OUTPATIENT
Start: 2017-08-16 | End: 2017-08-26

## 2017-08-16 RX ORDER — LISINOPRIL 20 MG/1
20 TABLET ORAL DAILY
Qty: 30 TAB | Refills: 3 | Status: SHIPPED | OUTPATIENT
Start: 2017-08-16 | End: 2018-10-18 | Stop reason: SDUPTHER

## 2017-08-16 RX ORDER — AMLODIPINE BESYLATE 10 MG/1
10 TABLET ORAL DAILY
Qty: 30 TAB | Refills: 3 | Status: SHIPPED | OUTPATIENT
Start: 2017-08-16 | End: 2018-10-18 | Stop reason: SDUPTHER

## 2017-08-16 RX ORDER — TRAZODONE HYDROCHLORIDE 150 MG/1
150 TABLET ORAL
COMMUNITY
End: 2018-12-05 | Stop reason: SDUPTHER

## 2017-08-16 NOTE — MR AVS SNAPSHOT
Visit Information Date & Time Provider Department Dept. Phone Encounter #  
 8/16/2017  1:00 PM Mandie Buitrago7 [phone] [id number] Your Appointments 9/6/2017  3:15 PM  
ESTABLISHED PATIENT with Lorena Solomon, NP [address] [facility] ([facility]) Appt Note: APPT F/U Labs [facility]  
  
   
 [phone] [id number] Upcoming Health Maintenance Date Due Pneumococcal 19-64 Medium Risk (1 of 1 - PPSV23) 1/20/1987 DTaP/Tdap/Td series (1 - Tdap) 1/20/1989 PAP AKA CERVICAL CYTOLOGY 1/20/1989 INFLUENZA AGE 9 TO ADULT 8/1/2017 Allergies as of 8/16/2017  Review Complete On: 8/16/2017 By: Clent Areas No Known Allergies Current Immunizations  Never Reviewed No immunizations on file. Not reviewed this visit You Were Diagnosed With   
  
 Codes Comments Essential hypertension    -  Primary ICD-10-CM: I10 
ICD-9-CM: 401.9 Dermoid cyst of leg, left     ICD-10-CM: D23.72 
ICD-9-CM: 216.7 Anemia, unspecified type     ICD-10-CM: D64.9 ICD-9-CM: 285.9 History of heroin use     ICD-10-CM: Z86.59 
ICD-9-CM: V11.8 Smoker     ICD-10-CM: L53.750 ICD-9-CM: 305.1 Vitals BP Pulse Temp Resp Height(growth percentile) Weight(growth percentile) 153/90 (BP 1 Location: Left arm, BP Patient Position: Sitting) 68 98.7 °F (37.1 °C) (Oral) 16 5' 8\" (1.727 m) 129 lb (58.5 kg) SpO2 BMI OB Status Smoking Status 100% 19.61 kg/m2 Postmenopausal Current Every Day Smoker Vitals History BMI and BSA Data Body Mass Index Body Surface Area  
 19.61 kg/m 2 1.68 m 2 Preferred Pharmacy Pharmacy Name Phone [facility] Bhanu Mohamud, [address] [phone] Your Updated Medication List  
  
   
This list is accurate as of: 8/16/17  2:08 PM.  Always use your most recent med list.  
 amLODIPine 10 mg tablet Commonly known as:  Alon Natan Take 1 Tab by mouth daily. cephALEXin 250 mg capsule Commonly known as:  Mariah Salvage Take 1 Cap by mouth three (3) times daily for 10 days. lisinopril 20 mg tablet Commonly known as:  Era Golder Take 1 Tab by mouth daily. metoprolol tartrate 50 mg tablet Commonly known as:  LOPRESSOR Take 1 Tab by mouth two (2) times a day. traZODone 150 mg tablet Commonly known as:  Rico Harrier Take 150 mg by mouth nightly. Prescriptions Sent to Pharmacy Refills  
 metoprolol tartrate (LOPRESSOR) 50 mg tablet 3 Sig: Take 1 Tab by mouth two (2) times a day. Class: Normal  
 Pharmacy: [facility]  Nancy Robison, [address] Ph #: [phone] Route: Oral  
 lisinopril (PRINIVIL, ZESTRIL) 20 mg tablet 3 Sig: Take 1 Tab by mouth daily. Class: Normal  
 Pharmacy: [facility]  Nancy Robison, [address] Ph #: [phone] Route: Oral  
 amLODIPine (NORVASC) 10 mg tablet 3 Sig: Take 1 Tab by mouth daily. Class: Normal  
 Pharmacy: [facility]  Nancy Robison, [address] Ph #: [phone] Route: Oral  
 cephALEXin (KEFLEX) 250 mg capsule 0 Sig: Take 1 Cap by mouth three (3) times daily for 10 days. Class: Normal  
 Pharmacy: [facility]  Nancy Robison, [address] Ph #: [phone] Route: Oral  
  
To-Do List   
 09/15/2017 Lab:  OCCULT BLOOD, IMMUNOASSAY (FIT) Patient Instructions Iron-Rich Diet: Care Instructions Your Care Instructions Your body needs iron to make hemoglobin. Hemoglobin is a substance in red blood cells that carries oxygen from the lungs to cells all through your body. If you do not get enough iron, your body makes fewer and smaller red blood cells. As a result, your body's cells may not get enough oxygen. Adult men need 8 milligrams of iron a day; adult women need 18 milligrams of iron a day. After menopause, women need 8 milligrams of iron a day. A pregnant woman needs 27 milligrams of iron a day. Infants and young children have higher iron needs relative to their size than other age groups. People who have lost blood because of ulcers or heavy menstrual periods may become very low in iron and may develop anemia. Most people can get the iron their bodies need by eating enough of certain iron-rich foods. Your doctor may recommend that you take an iron supplement along with eating an iron-rich diet. Follow-up care is a key part of your treatment and safety. Be sure to make and go to all appointments, and call your doctor if you are having problems. Its also a good idea to know your test results and keep a list of the medicines you take. How can you care for yourself at home? · Make iron-rich foods a part of your daily diet. Iron-rich foods include: ¨ All meats, such as chicken, beef, lamb, pork, fish, and shellfish. Liver is especially high in iron. ¨ Leafy green vegetables. ¨ Raisins, peas, beans, lentils, barley, and eggs. ¨ Iron-fortified breakfast cereals. · Eat foods with vitamin C along with iron-rich foods. Vitamin C helps you absorb more iron from food. Drink a glass of orange juice or another citrus juice with your food. · Eat meat and vegetables or grains together. The iron in meat helps your body absorb the iron in other foods. Where can you learn more? Go to http://esau-rosa.info/. Enter 0328 1774020 in the search box to learn more about \"Iron-Rich Diet: Care Instructions. \" Current as of: July 26, 2016 Content Version: 11.3 © 4921-2095 [employer]. Care instructions adapted under license by [employer] (which disclaims liability or warranty for this information).  If you have questions about a medical condition or this instruction, always ask your healthcare professional. [address] any warranty or liability for your use of this information. Epidermoid Cyst: Care Instructions Your Care Instructions An epidermoid (say \"Clovis\") cyst is a lump just under the skin. These cysts can form when a hair follicle becomes blocked. They are common in acne and may occur on the face, neck, back, and genitals. However, they can form anywhere on the body. These cysts are not cancer and do not lead to cancer. They tend not to hurt, but they can sometimes become swollen and painful. They also may break open (rupture) and cause scarring. These cysts sometimes do not cause problems and may not need treatment. If you have a cyst that is swollen and hurts, your doctor may inject it with a medicine to help it heal. But it is more likely that a painful cyst will need to be removed. Your doctor will give you a shot of numbing medicine and cut into the cyst to drain it or remove it. This makes the symptoms go away. Follow-up care is a key part of your treatment and safety. Be sure to make and go to all appointments, and call your doctor if you are having problems. Its also a good idea to know your test results and keep a list of the medicines you take. How can you care for yourself at home? · Do not squeeze the cyst or poke it with a needle to open it. This can cause swelling, redness, and infection. · Always have a doctor look at any new lumps you get to make sure that they are not serious. When should you call for help? Watch closely for changes in your health, and be sure to contact your doctor if: 
· You have a fever, redness, or swelling after you get a shot of medicine in the cyst. 
· You see or feel a new lump on your skin. Where can you learn more? Go to http://esau-rosa.info/. Enter O038 in the search box to learn more about \"Epidermoid Cyst: Care Instructions. \" 
 Current as of: October 13, 2016 Content Version: 11.3 © 8260-6124 [employer], [employer]. Care instructions adapted under license by [employer] (which disclaims liability or warranty for this information). If you have questions about a medical condition or this instruction, always ask your healthcare professional. [address] any warranty or liability for your use of this information. Complete FIT KIT as instructed, and return for follow up appointment. Introducing [facility] [place] [facility]! [facility] introduces [employer] patient portal. Now you can access parts of your medical record, email your doctor's office, and request medication refills online. 1. In your internet browser, go to [email or web address] 2. Click on the First Time User? Click Here link in the Sign In box. You will see the New Member Sign Up page. 3. Enter your [employer] Access Code exactly as it appears below. You will not need to use this code after youve completed the sign-up process. If you do not sign up before the expiration date, you must request a new code. · [employer] Access Code: [email or web address] Expires: 10/28/2017 12:30 PM 
 
4. Enter the last four digits of your Social Security Number (xxxx) and Date of Birth (mm/dd/yyyy) as indicated and click Submit. You will be taken to the next sign-up page. 5. Create a [employer] ID. This will be your [employer] login ID and cannot be changed, so think of one that is secure and easy to remember. 6. Create a [employer]US password. You can change your password at any time. 7. Enter your Password Reset Question and Answer. This can be used at a later time if you forget your password. 8. Enter your e-mail address. You will receive e-mail notification when new information is available in [address] 9. Click Sign Up. You can now view and download portions of your medical record. 10. Click the Download Summary menu link to download a portable copy of your medical information. If you have questions, please visit the Frequently Asked Questions section of the [employer] website. Remember, [employer] is NOT to be used for urgent needs. For medical emergencies, dial 911. Now available from your iPhone and Android! Please provide this summary of care documentation to your next provider. Your primary care clinician is listed as Yokasta Smith. If you have any questions after today's visit, please call [phone].

## 2017-08-16 NOTE — PROGRESS NOTES
HPI  Clayborn Seip is a 50 y.o. female being seen today for follow up needs more antibiotic for leg cyst.Completed all the cephalaxin that she was given and leg was improving but then it worsened when the antibiotic ran out. Was in hospital for \"my blood pressure\". Reading the note it was a pysch evlauation as well. Pt still using heroine \"on and off\". Was started on seroquel and trazodone which helped her sleep but she ran out. Chief Complaint   Patient presents with    Medication Refill   .  she states that she was never anemic and doesn't have menses. Past Medical History:   Diagnosis Date    Hypercholesterolemia     Hyperlipidemia     Hypertension     Other ill-defined conditions     sinus    Other ill-defined conditions     hypercholesteral         ROS  Patient states that she is feeling well. Denies complaints of chest pain, shortness of breath, swelling of legs, dizziness or weakness. she denies nausea, vomiting or diarrhea. Current Outpatient Prescriptions   Medication Sig    traZODone (DESYREL) 150 mg tablet Take 150 mg by mouth nightly.  metoprolol tartrate (LOPRESSOR) 50 mg tablet Take 1 Tab by mouth two (2) times a day.  lisinopril (PRINIVIL, ZESTRIL) 20 mg tablet Take 1 Tab by mouth daily.  amLODIPine (NORVASC) 10 mg tablet Take 1 Tab by mouth daily.  cephALEXin (KEFLEX) 250 mg capsule Take 1 Cap by mouth three (3) times daily for 10 days. No current facility-administered medications for this visit. PE  Visit Vitals    /90 (BP 1 Location: Left arm, BP Patient Position: Sitting)    Pulse 68    Temp 98.7 °F (37.1 °C) (Oral)    Resp 16    Ht 5' 8\" (1.727 m)    Wt 129 lb (58.5 kg)    SpO2 100%    BMI 19.61 kg/m2        Alert and oriented with normal mood and affect. she is well developed and well nourished . Lungs are clear without wheezing. Heart rate is regular without murmurs or gallops.  Left leg with swelling from knee to ankle, 1+ and raised area, slightly fluctuant on lateral aspect of left leg. Slightly warm at site of swelling on lateral leg. Results for orders placed or performed during the hospital encounter of 07/18/17   CBC WITH AUTOMATED DIFF   Result Value Ref Range    WBC 3.4 (L) 4.6 - 13.2 K/uL    RBC 3.49 (L) 4.20 - 5.30 M/uL    HGB 9.7 (L) 12.0 - 16.0 g/dL    HCT 29.6 (L) 35.0 - 45.0 %    MCV 84.8 74.0 - 97.0 FL    MCH 27.8 24.0 - 34.0 PG    MCHC 32.8 31.0 - 37.0 g/dL    RDW 14.4 11.6 - 14.5 %    PLATELET 230 800 - 311 K/uL    MPV 9.3 9.2 - 11.8 FL    NEUTROPHILS 66 40 - 73 %    LYMPHOCYTES 26 21 - 52 %    MONOCYTES 7 3 - 10 %    EOSINOPHILS 0 0 - 5 %    BASOPHILS 1 0 - 2 %    ABS. NEUTROPHILS 2.2 1.8 - 8.0 K/UL    ABS. LYMPHOCYTES 0.9 0.9 - 3.6 K/UL    ABS. MONOCYTES 0.2 0.05 - 1.2 K/UL    ABS. EOSINOPHILS 0.0 0.0 - 0.4 K/UL    ABS. BASOPHILS 0.0 0.0 - 0.06 K/UL    DF AUTOMATED     METABOLIC PANEL, COMPREHENSIVE   Result Value Ref Range    Sodium 139 136 - 145 mmol/L    Potassium 3.1 (L) 3.5 - 5.5 mmol/L    Chloride 102 100 - 108 mmol/L    CO2 31 21 - 32 mmol/L    Anion gap 6 3.0 - 18 mmol/L    Glucose 136 (H) 74 - 99 mg/dL    BUN 17 7.0 - 18 MG/DL    Creatinine 1.54 (H) 0.6 - 1.3 MG/DL    BUN/Creatinine ratio 11 (L) 12 - 20      GFR est AA 43 (L) >60 ml/min/1.73m2    GFR est non-AA 36 (L) >60 ml/min/1.73m2    Calcium 9.1 8.5 - 10.1 MG/DL    Bilirubin, total 0.1 (L) 0.2 - 1.0 MG/DL    ALT (SGPT) 28 13 - 56 U/L    AST (SGOT) 28 15 - 37 U/L    Alk.  phosphatase 117 45 - 117 U/L    Protein, total 8.1 6.4 - 8.2 g/dL    Albumin 3.1 (L) 3.4 - 5.0 g/dL    Globulin 5.0 (H) 2.0 - 4.0 g/dL    A-G Ratio 0.6 (L) 0.8 - 1.7     ETHYL ALCOHOL   Result Value Ref Range    ALCOHOL(ETHYL),SERUM <3 0 - 3 MG/DL   DRUG SCREEN, URINE   Result Value Ref Range    BENZODIAZEPINE NEGATIVE  NEG      BARBITURATES NEGATIVE  NEG      THC (TH-CANNABINOL) NEGATIVE  NEG      OPIATES POSITIVE (A) NEG      PCP(PHENCYCLIDINE) NEGATIVE  NEG      COCAINE POSITIVE (A) NEG AMPHETAMINES NEGATIVE  NEG      METHADONE NEGATIVE  NEG      HDSCOM (NOTE)          Assessment and Plan:        ICD-10-CM ICD-9-CM    1. Essential hypertension I10 401.9 metoprolol tartrate (LOPRESSOR) 50 mg tablet      lisinopril (PRINIVIL, ZESTRIL) 20 mg tablet      amLODIPine (NORVASC) 10 mg tablet      METABOLIC PANEL, COMPREHENSIVE      CBC WITH AUTOMATED DIFF   2. Dermoid cyst of leg, left P29.63 762.3 METABOLIC PANEL, COMPREHENSIVE      cephALEXin (KEFLEX) 250 mg capsule   3. Anemia, unspecified type D64.9 285.9 OCCULT BLOOD, IMMUNOASSAY (FIT)      SED RATE (ESR)      C REACTIVE PROTEIN, QT   4. History of heroin use Z86.59 V11.8    5. Smoker F17.200 305. 1    pt will refer to dalia to refill seroquel  Anemic will repeat, discussed iron rich foods, stool card and further labs  Refill bp medications  F/u 3 weeks to recheck leg    Rosalind Mccabe NP

## 2017-08-16 NOTE — PROGRESS NOTES
Lab orders entered patient having blood drawn at [facility] 8/17/17. Patient told to drink lots of water prior to reporting to LAB. FIT KIT instructions reviewed. Return appointment made. Discharge instructions reviewed with patient. New medications reviewed. Patient given LAB orders, FIT KIT, coupon for medication, ands AVS.    Follow up appointment made. Barriers to adherence assessed.

## 2017-08-16 NOTE — PATIENT INSTRUCTIONS
Iron-Rich Diet: Care Instructions  Your Care Instructions  Your body needs iron to make hemoglobin. Hemoglobin is a substance in red blood cells that carries oxygen from the lungs to cells all through your body. If you do not get enough iron, your body makes fewer and smaller red blood cells. As a result, your body's cells may not get enough oxygen. Adult men need 8 milligrams of iron a day; adult women need 18 milligrams of iron a day. After menopause, women need 8 milligrams of iron a day. A pregnant woman needs 27 milligrams of iron a day. Infants and young children have higher iron needs relative to their size than other age groups. People who have lost blood because of ulcers or heavy menstrual periods may become very low in iron and may develop anemia. Most people can get the iron their bodies need by eating enough of certain iron-rich foods. Your doctor may recommend that you take an iron supplement along with eating an iron-rich diet. Follow-up care is a key part of your treatment and safety. Be sure to make and go to all appointments, and call your doctor if you are having problems. Its also a good idea to know your test results and keep a list of the medicines you take. How can you care for yourself at home? · Make iron-rich foods a part of your daily diet. Iron-rich foods include:  ¨ All meats, such as chicken, beef, lamb, pork, fish, and shellfish. Liver is especially high in iron. ¨ Leafy green vegetables. ¨ Raisins, peas, beans, lentils, barley, and eggs. ¨ Iron-fortified breakfast cereals. · Eat foods with vitamin C along with iron-rich foods. Vitamin C helps you absorb more iron from food. Drink a glass of orange juice or another citrus juice with your food. · Eat meat and vegetables or grains together. The iron in meat helps your body absorb the iron in other foods. Where can you learn more? Go to http://esau-rosa.info/.   Enter [id number] [id number] in the search box to learn more about \"Iron-Rich Diet: Care Instructions. \"  Current as of: July 26, 2016  Content Version: 11.3  © 3591-3152 [employer]. Care instructions adapted under license by [employer] (which disclaims liability or warranty for this information). If you have questions about a medical condition or this instruction, always ask your healthcare professional. [address] any warranty or liability for your use of this information. Epidermoid Cyst: Care Instructions  Your Care Instructions  An epidermoid (say \"rz-cwv-YLO-moyd\") cyst is a lump just under the skin. These cysts can form when a hair follicle becomes blocked. They are common in acne and may occur on the face, neck, back, and genitals. However, they can form anywhere on the body. These cysts are not cancer and do not lead to cancer. They tend not to hurt, but they can sometimes become swollen and painful. They also may break open (rupture) and cause scarring. These cysts sometimes do not cause problems and may not need treatment. If you have a cyst that is swollen and hurts, your doctor may inject it with a medicine to help it heal. But it is more likely that a painful cyst will need to be removed. Your doctor will give you a shot of numbing medicine and cut into the cyst to drain it or remove it. This makes the symptoms go away. Follow-up care is a key part of your treatment and safety. Be sure to make and go to all appointments, and call your doctor if you are having problems. Its also a good idea to know your test results and keep a list of the medicines you take. How can you care for yourself at home? · Do not squeeze the cyst or poke it with a needle to open it. This can cause swelling, redness, and infection. · Always have a doctor look at any new lumps you get to make sure that they are not serious. When should you call for help?   Watch closely for changes in your health, and be sure to contact your doctor if:  · You have a fever, redness, or swelling after you get a shot of medicine in the cyst.  · You see or feel a new lump on your skin. Where can you learn more? Go to http://esau-rosa.info/. Enter T901 in the search box to learn more about \"Epidermoid Cyst: Care Instructions. \"  Current as of: October 13, 2016  Content Version: 11.3  © 3737-8455 [employer]. Care instructions adapted under license by [employer] (which disclaims liability or warranty for this information). If you have questions about a medical condition or this instruction, always ask your healthcare professional. [place] [address] any warranty or liability for your use of this information. Complete FIT KIT as instructed, and return for follow up appointment.

## 2017-08-23 ENCOUNTER — HOSPITAL ENCOUNTER (OUTPATIENT)
Dept: LAB | Age: 47
Discharge: HOME | End: 2017-08-23

## 2017-08-23 DIAGNOSIS — D64.9 ANEMIA, UNSPECIFIED TYPE: ICD-10-CM

## 2017-08-23 PROCEDURE — 82274 ASSAY TEST FOR BLOOD FECAL: CPT | Performed by: NURSE PRACTITIONER

## 2017-08-25 LAB — HEMOCCULT STL QL IA: NEGATIVE

## 2017-10-20 PROCEDURE — 99284 EMERGENCY DEPT VISIT MOD MDM: CPT

## 2017-10-20 PROCEDURE — 81001 URINALYSIS AUTO W/SCOPE: CPT | Performed by: EMERGENCY MEDICINE

## 2017-10-20 PROCEDURE — 99285 EMERGENCY DEPT VISIT HI MDM: CPT

## 2017-10-20 PROCEDURE — 75810000289 HC I&D ABSCESS SIMP/COMP/MULT

## 2017-10-20 PROCEDURE — 80307 DRUG TEST PRSMV CHEM ANLYZR: CPT | Performed by: EMERGENCY MEDICINE

## 2017-10-20 PROCEDURE — 81025 URINE PREGNANCY TEST: CPT | Performed by: EMERGENCY MEDICINE

## 2017-10-21 ENCOUNTER — APPOINTMENT (OUTPATIENT)
Dept: CT IMAGING | Age: 47
End: 2017-10-21
Attending: EMERGENCY MEDICINE
Payer: SELF-PAY

## 2017-10-21 ENCOUNTER — HOSPITAL ENCOUNTER (EMERGENCY)
Age: 47
Discharge: OTHER INSTITUTION | End: 2017-10-23
Attending: EMERGENCY MEDICINE | Admitting: EMERGENCY MEDICINE
Payer: SELF-PAY

## 2017-10-21 DIAGNOSIS — L02.419 ABSCESS OF LOWER LEG: ICD-10-CM

## 2017-10-21 DIAGNOSIS — I10 HYPERTENSION, UNSPECIFIED TYPE: ICD-10-CM

## 2017-10-21 DIAGNOSIS — E87.6 HYPOKALEMIA: ICD-10-CM

## 2017-10-21 DIAGNOSIS — F41.9 ANXIETY: Primary | ICD-10-CM

## 2017-10-21 LAB
ALBUMIN SERPL-MCNC: 3.3 G/DL (ref 3.4–5)
ALBUMIN/GLOB SERPL: 0.6 {RATIO} (ref 0.8–1.7)
ALP SERPL-CCNC: 128 U/L (ref 45–117)
ALT SERPL-CCNC: 30 U/L (ref 13–56)
AMPHET UR QL SCN: NEGATIVE
ANION GAP SERPL CALC-SCNC: 5 MMOL/L (ref 3–18)
APPEARANCE UR: CLEAR
AST SERPL-CCNC: 31 U/L (ref 15–37)
BACTERIA URNS QL MICRO: NEGATIVE /HPF
BARBITURATES UR QL SCN: NEGATIVE
BASOPHILS # BLD: 0 K/UL (ref 0–0.1)
BASOPHILS NFR BLD: 1 % (ref 0–2)
BENZODIAZ UR QL: NEGATIVE
BILIRUB SERPL-MCNC: 0.3 MG/DL (ref 0.2–1)
BILIRUB UR QL: NEGATIVE
BUN SERPL-MCNC: 24 MG/DL (ref 7–18)
BUN/CREAT SERPL: 14 (ref 12–20)
CALCIUM SERPL-MCNC: 8.2 MG/DL (ref 8.5–10.1)
CANNABINOIDS UR QL SCN: NEGATIVE
CHLORIDE SERPL-SCNC: 104 MMOL/L (ref 100–108)
CO2 SERPL-SCNC: 28 MMOL/L (ref 21–32)
COCAINE UR QL SCN: POSITIVE
COLOR UR: YELLOW
CREAT SERPL-MCNC: 1.69 MG/DL (ref 0.6–1.3)
DIFFERENTIAL METHOD BLD: ABNORMAL
EOSINOPHIL # BLD: 0.2 K/UL (ref 0–0.4)
EOSINOPHIL NFR BLD: 5 % (ref 0–5)
EPITH CASTS URNS QL MICRO: NORMAL /LPF (ref 0–5)
ERYTHROCYTE [DISTWIDTH] IN BLOOD BY AUTOMATED COUNT: 16.3 % (ref 11.6–14.5)
ETHANOL SERPL-MCNC: <3 MG/DL (ref 0–3)
GLOBULIN SER CALC-MCNC: 5.3 G/DL (ref 2–4)
GLUCOSE SERPL-MCNC: 78 MG/DL (ref 74–99)
GLUCOSE UR STRIP.AUTO-MCNC: NEGATIVE MG/DL
HCG UR QL: NEGATIVE
HCT VFR BLD AUTO: 28.8 % (ref 35–45)
HDSCOM,HDSCOM: ABNORMAL
HGB BLD-MCNC: 9.2 G/DL (ref 12–16)
HGB UR QL STRIP: ABNORMAL
HYALINE CASTS URNS QL MICRO: NORMAL /LPF (ref 0–2)
KETONES UR QL STRIP.AUTO: NEGATIVE MG/DL
LEUKOCYTE ESTERASE UR QL STRIP.AUTO: NEGATIVE
LYMPHOCYTES # BLD: 1.4 K/UL (ref 0.9–3.6)
LYMPHOCYTES NFR BLD: 37 % (ref 21–52)
MAGNESIUM SERPL-MCNC: 1.8 MG/DL (ref 1.6–2.6)
MCH RBC QN AUTO: 27.1 PG (ref 24–34)
MCHC RBC AUTO-ENTMCNC: 31.9 G/DL (ref 31–37)
MCV RBC AUTO: 85 FL (ref 74–97)
METHADONE UR QL: NEGATIVE
MONOCYTES # BLD: 0.3 K/UL (ref 0.05–1.2)
MONOCYTES NFR BLD: 8 % (ref 3–10)
NEUTS SEG # BLD: 1.9 K/UL (ref 1.8–8)
NEUTS SEG NFR BLD: 49 % (ref 40–73)
NITRITE UR QL STRIP.AUTO: NEGATIVE
OPIATES UR QL: POSITIVE
PCP UR QL: NEGATIVE
PH UR STRIP: 6 [PH] (ref 5–8)
PLATELET # BLD AUTO: 136 K/UL (ref 135–420)
PMV BLD AUTO: 9.5 FL (ref 9.2–11.8)
POTASSIUM SERPL-SCNC: 2.8 MMOL/L (ref 3.5–5.5)
PROT SERPL-MCNC: 8.6 G/DL (ref 6.4–8.2)
PROT UR STRIP-MCNC: 100 MG/DL
RBC # BLD AUTO: 3.39 M/UL (ref 4.2–5.3)
RBC #/AREA URNS HPF: NORMAL /HPF (ref 0–5)
SODIUM SERPL-SCNC: 137 MMOL/L (ref 136–145)
SP GR UR REFRACTOMETRY: 1.02 (ref 1–1.03)
UROBILINOGEN UR QL STRIP.AUTO: 0.2 EU/DL (ref 0.2–1)
WBC # BLD AUTO: 3.9 K/UL (ref 4.6–13.2)
WBC URNS QL MICRO: NORMAL /HPF (ref 0–5)

## 2017-10-21 PROCEDURE — 74011250637 HC RX REV CODE- 250/637: Performed by: EMERGENCY MEDICINE

## 2017-10-21 PROCEDURE — 80053 COMPREHEN METABOLIC PANEL: CPT | Performed by: EMERGENCY MEDICINE

## 2017-10-21 PROCEDURE — 87070 CULTURE OTHR SPECIMN AEROBIC: CPT | Performed by: EMERGENCY MEDICINE

## 2017-10-21 PROCEDURE — 87077 CULTURE AEROBIC IDENTIFY: CPT | Performed by: EMERGENCY MEDICINE

## 2017-10-21 PROCEDURE — 93005 ELECTROCARDIOGRAM TRACING: CPT

## 2017-10-21 PROCEDURE — 85025 COMPLETE CBC W/AUTO DIFF WBC: CPT | Performed by: EMERGENCY MEDICINE

## 2017-10-21 PROCEDURE — 70450 CT HEAD/BRAIN W/O DYE: CPT

## 2017-10-21 PROCEDURE — 87186 SC STD MICRODIL/AGAR DIL: CPT | Performed by: EMERGENCY MEDICINE

## 2017-10-21 PROCEDURE — 83735 ASSAY OF MAGNESIUM: CPT | Performed by: EMERGENCY MEDICINE

## 2017-10-21 PROCEDURE — 80307 DRUG TEST PRSMV CHEM ANLYZR: CPT | Performed by: EMERGENCY MEDICINE

## 2017-10-21 PROCEDURE — 74011000250 HC RX REV CODE- 250: Performed by: EMERGENCY MEDICINE

## 2017-10-21 RX ORDER — POTASSIUM CHLORIDE 750 MG/1
10 CAPSULE, EXTENDED RELEASE ORAL 2 TIMES DAILY
Qty: 14 CAP | Refills: 0 | Status: SHIPPED | OUTPATIENT
Start: 2017-10-21 | End: 2017-10-28

## 2017-10-21 RX ORDER — DOXYCYCLINE 100 MG/1
100 CAPSULE ORAL 2 TIMES DAILY
Qty: 14 CAP | Refills: 0 | Status: SHIPPED | OUTPATIENT
Start: 2017-10-21 | End: 2017-10-28

## 2017-10-21 RX ORDER — LISINOPRIL 20 MG/1
20 TABLET ORAL
Status: COMPLETED | OUTPATIENT
Start: 2017-10-21 | End: 2017-10-21

## 2017-10-21 RX ORDER — CLONIDINE 0.2 MG/24H
1 PATCH, EXTENDED RELEASE TRANSDERMAL
Status: DISCONTINUED | OUTPATIENT
Start: 2017-10-21 | End: 2017-10-23 | Stop reason: HOSPADM

## 2017-10-21 RX ORDER — SULFAMETHOXAZOLE AND TRIMETHOPRIM 400; 80 MG/1; MG/1
1 TABLET ORAL 2 TIMES DAILY
Status: DISCONTINUED | OUTPATIENT
Start: 2017-10-21 | End: 2017-10-23 | Stop reason: HOSPADM

## 2017-10-21 RX ORDER — POTASSIUM CHLORIDE 7.45 MG/ML
10 INJECTION INTRAVENOUS
Status: DISCONTINUED | OUTPATIENT
Start: 2017-10-21 | End: 2017-10-21

## 2017-10-21 RX ORDER — SULFAMETHOXAZOLE AND TRIMETHOPRIM 800; 160 MG/1; MG/1
2 TABLET ORAL
Status: COMPLETED | OUTPATIENT
Start: 2017-10-21 | End: 2017-10-21

## 2017-10-21 RX ORDER — POTASSIUM CHLORIDE 1.5 G/1.77G
40 POWDER, FOR SOLUTION ORAL
Status: COMPLETED | OUTPATIENT
Start: 2017-10-21 | End: 2017-10-21

## 2017-10-21 RX ORDER — LIDOCAINE HYDROCHLORIDE AND EPINEPHRINE 10; 10 MG/ML; UG/ML
10 INJECTION, SOLUTION INFILTRATION; PERINEURAL ONCE
Status: COMPLETED | OUTPATIENT
Start: 2017-10-21 | End: 2017-10-21

## 2017-10-21 RX ORDER — ACETAMINOPHEN 500 MG
1000 TABLET ORAL
Status: COMPLETED | OUTPATIENT
Start: 2017-10-21 | End: 2017-10-21

## 2017-10-21 RX ORDER — AMLODIPINE BESYLATE 10 MG/1
10 TABLET ORAL
Status: COMPLETED | OUTPATIENT
Start: 2017-10-21 | End: 2017-10-21

## 2017-10-21 RX ORDER — SULFAMETHOXAZOLE AND TRIMETHOPRIM 400; 80 MG/1; MG/1
1 TABLET ORAL 2 TIMES DAILY
Status: DISCONTINUED | OUTPATIENT
Start: 2017-10-21 | End: 2017-10-21

## 2017-10-21 RX ADMIN — POTASSIUM CHLORIDE 40 MEQ: 1.5 POWDER, FOR SOLUTION ORAL at 04:03

## 2017-10-21 RX ADMIN — ACETAMINOPHEN 1000 MG: 500 TABLET, COATED ORAL at 13:35

## 2017-10-21 RX ADMIN — AMLODIPINE BESYLATE 10 MG: 10 TABLET ORAL at 01:58

## 2017-10-21 RX ADMIN — LIDOCAINE HYDROCHLORIDE,EPINEPHRINE BITARTRATE 100 MG: 10; .01 INJECTION, SOLUTION INFILTRATION; PERINEURAL at 04:05

## 2017-10-21 RX ADMIN — SULFAMETHOXAZOLE AND TRIMETHOPRIM 2 TABLET: 800; 160 TABLET ORAL at 04:55

## 2017-10-21 RX ADMIN — LISINOPRIL 20 MG: 20 TABLET ORAL at 01:58

## 2017-10-21 RX ADMIN — SULFAMETHOXAZOLE AND TRIMETHOPRIM 1 TABLET: 400; 80 TABLET ORAL at 23:33

## 2017-10-21 NOTE — ED NOTES
Bedside shift change report given to Senait Parish RN (oncoming nurse) by Nano Wooten RN (offgoing nurse). Report included the following information SBAR, ED Summary, Intake/Output, MAR and Recent Results.

## 2017-10-21 NOTE — ED NOTES
ADDENDUM      Patient's care was signed over to me at 0700 on 10/21/17. Patient's care signed over to me pending crisis evaluation. Pt resting comfortably, without complaints. 11:51 AM Luana Madison with crisis has evaluated the patient. Will place [facility] referral.     6:50 PM pt remains without complaints, placement pending. Patient's care signed over to Dr Sammy Meza pending placement. IMPRESSION:   1. Anxiety    2. Hypertension, unspecified type    3. Abscess of lower leg    4.  Hypokalemia        DISPO: pending    Ernesto Hernandez MD

## 2017-10-21 NOTE — ED PROVIDER NOTES
HPI Comments: 1:13 AM Checo Reddy is a 50 y.o. female with a history of HTN and hyperlipidemia presents to ED c/o a headache since being off her blood pressure medication. Pt states that she usually takes Lisinopril 20 mg, Metoprolol 20 mg, and Amlodipine 10 mg. Denies vision problems or chest pain. Pt also reports that she is having suicidal ideations. She doesn't have a plan. She used heroin this morning (injection). She also used cocaine this morning, injected with her heroin. Pt also c/o an abscess on her right leg ongoing for one month. She is unsure if she has been running a temperature. No further complaints at the moment. The history is provided by the patient. Past Medical History:   Diagnosis Date    Hypercholesterolemia     Hyperlipidemia     Hypertension     Other ill-defined conditions     sinus    Other ill-defined conditions     hypercholesteral       Past Surgical History:   Procedure Laterality Date    HX HYSTERECTOMY           Family History:   Problem Relation Age of Onset    Diabetes Mother     Diabetes Brother        Social History     Social History    Marital status: SINGLE     Spouse name: N/A    Number of children: N/A    Years of education: N/A     Occupational History    Not on file. Social History Main Topics    Smoking status: Current Every Day Smoker     Packs/day: 0.50     Types: Cigarettes    Smokeless tobacco: Never Used    Alcohol use No    Drug use: 5.00 per week     Special: Heroin    Sexual activity: No     Other Topics Concern    Not on file     Social History Narrative         ALLERGIES: Review of patient's allergies indicates no known allergies. Review of Systems   Constitutional: Negative for chills, fatigue and fever. HENT: Negative for congestion, ear pain, rhinorrhea and sore throat. Eyes: Negative for pain, redness and itching. Respiratory: Negative for cough, chest tightness and shortness of breath.     Cardiovascular: Negative for chest pain, palpitations and leg swelling. Gastrointestinal: Negative for abdominal pain, diarrhea, nausea and vomiting. Genitourinary: Negative for decreased urine volume, dysuria, flank pain, hematuria and pelvic pain. Musculoskeletal: Negative for arthralgias, back pain, joint swelling and myalgias. Skin: Positive for color change. Negative for pallor and rash. Positive for abscess    Neurological: Positive for headaches. Negative for dizziness and weakness. Hematological: Negative for adenopathy. Does not bruise/bleed easily. Psychiatric/Behavioral: Positive for suicidal ideas. All other systems reviewed and are negative. Vitals:    10/21/17 0445 10/21/17 0500 10/21/17 0515 10/21/17 0530   BP: 133/74 136/69 141/81 141/84   Pulse:       Resp:       Temp:       SpO2: 98% 98% 98% 98%            Physical Exam   Constitutional: No distress. HENT:   Head: Normocephalic and atraumatic. Mouth/Throat: Oropharynx is clear and moist.   Eyes: Conjunctivae and EOM are normal. Pupils are equal, round, and reactive to light. Neck: Normal range of motion. Neck supple. Cardiovascular: Normal rate, regular rhythm and normal heart sounds. No murmur heard. Pulmonary/Chest: Effort normal and breath sounds normal. She has no wheezes. She has no rales. Abdominal: Soft. Bowel sounds are normal. She exhibits no distension. There is no tenderness. Musculoskeletal: Normal range of motion. She exhibits no edema or deformity. Lymphadenopathy:     She has no cervical adenopathy. Neurological: She is alert. She exhibits normal muscle tone. Coordination normal.   Skin: Skin is warm and dry. No rash noted. She is not diaphoretic. Positive for right shin abscess 2 cm area of induration and fluctuance   Positive for left shin abscess 1 cm area of induration and fluctuance    Psychiatric: She has a normal mood and affect.  Her behavior is normal.        MDM  Number of Diagnoses or Management Options    ED Course       I&D Abcess Simple  Date/Time: 10/21/2017 3:21 AM  Performed by: Vamshi Whaley  Authorized by: Vamshi Whaley     Consent:     Consent obtained:  Written and verbal    Consent given by:  Patient    Risks discussed:  Bleeding, incomplete drainage, pain and infection    Alternatives discussed:  No treatment and delayed treatment  Location:     Type:  Abscess    Size:  2 cm (right shin) 1 cm (left shin)    Location: Bilateral shin   Pre-procedure details:     Skin preparation:  Chloraprep  Anesthesia (see MAR for exact dosages): Anesthesia method:  Local infiltration    Local anesthetic:  Lidocaine 1% WITH epi  Procedure type:     Complexity:  Simple  Procedure details:     Incision types:  Single straight    Incision depth:  Dermal    Scalpel blade:  10    Wound management:  Probed and deloculated    Drainage:  Serosanguinous    Drainage amount: Moderate    Wound treatment:  Wound left open    Packing materials:  None  Post-procedure details:     Patient tolerance of procedure:   Tolerated well, no immediate complications        Vitals:  Patient Vitals for the past 12 hrs:   Temp Pulse Resp BP SpO2   10/21/17 0530 - - - 141/84 98 %   10/21/17 0515 - - - 141/81 98 %   10/21/17 0500 - - - 136/69 98 %   10/21/17 0445 - - - 133/74 98 %   10/21/17 0430 - - - 134/80 99 %   10/21/17 0415 - - - (!) 150/93 99 %   10/21/17 0400 - - - (!) 185/114 100 %   10/21/17 0345 - - - (!) 180/102 100 %   10/21/17 0330 - - - (!) 195/111 -   10/21/17 0315 - - - (!) 204/111 100 %   10/21/17 0300 - - - (!) 193/112 100 %   10/21/17 0245 - - - (!) 190/113 100 %   10/21/17 0230 - - - (!) 212/110 100 %   10/21/17 0215 - - - (!) 223/117 100 %   10/21/17 0145 - - - (!) 189/116 100 %   10/21/17 0130 - - - (!) 204/117 100 %   10/21/17 0115 - - - (!) 202/109 100 %   10/21/17 0100 - - - (!) 202/111 100 %   10/20/17 2324 98.6 °F (37 °C) 75 16 (!) 243/133 99 %   Patient is 99% O2 on RA, indicating adequate oxygenation.        Medications ordered:   Medications   lidocaine-EPINEPHrine (XYLOCAINE) 1 %-1:100,000 injection 100 mg (100 mg IntraDERMal Given 10/21/17 0405)   lisinopril (PRINIVIL, ZESTRIL) tablet 20 mg (20 mg Oral Given 10/21/17 0158)   amLODIPine (NORVASC) tablet 10 mg (10 mg Oral Given 10/21/17 0158)   potassium chloride (KLOR-CON) packet 40 mEq (40 mEq Oral Given 10/21/17 0403)   trimethoprim-sulfamethoxazole (BACTRIM DS, SEPTRA DS) 160-800 mg per tablet 2 Tab (2 Tabs Oral Given 10/21/17 0455)         Lab findings:  Recent Results (from the past 12 hour(s))   DRUG SCREEN, URINE    Collection Time: 10/20/17 11:57 PM   Result Value Ref Range    BENZODIAZEPINES NEGATIVE  NEG      BARBITURATES NEGATIVE  NEG      THC (TH-CANNABINOL) NEGATIVE  NEG      OPIATES POSITIVE (A) NEG      PCP(PHENCYCLIDINE) NEGATIVE  NEG      COCAINE POSITIVE (A) NEG      AMPHETAMINES NEGATIVE  NEG      METHADONE NEGATIVE  NEG      HDSCOM (NOTE)    URINALYSIS W/ RFLX MICROSCOPIC    Collection Time: 10/20/17 11:57 PM   Result Value Ref Range    Color YELLOW      Appearance CLEAR      Specific gravity 1.016 1.005 - 1.030      pH (UA) 6.0 5.0 - 8.0      Protein 100 (A) NEG mg/dL    Glucose NEGATIVE  NEG mg/dL    Ketone NEGATIVE  NEG mg/dL    Bilirubin NEGATIVE  NEG      Blood TRACE (A) NEG      Urobilinogen 0.2 0.2 - 1.0 EU/dL    Nitrites NEGATIVE  NEG      Leukocyte Esterase NEGATIVE  NEG     URINE MICROSCOPIC ONLY    Collection Time: 10/20/17 11:57 PM   Result Value Ref Range    WBC 0 to 3 0 - 5 /hpf    RBC 0 to 1 0 - 5 /hpf    Epithelial cells FEW 0 - 5 /lpf    Bacteria NEGATIVE  NEG /hpf    Hyaline cast 0 to 3 0 - 2 /lpf   HCG URINE, QL    Collection Time: 10/20/17 11:57 PM   Result Value Ref Range    HCG urine, Ql. NEGATIVE  NEG     ETHYL ALCOHOL    Collection Time: 10/21/17  1:45 AM   Result Value Ref Range    ALCOHOL(ETHYL),SERUM <3 0 - 3 MG/DL   CBC WITH AUTOMATED DIFF    Collection Time: 10/21/17  1:45 AM   Result Value Ref Range    WBC 3.9 (L) 4.6 - 13.2 K/uL    RBC 3.39 (L) 4.20 - 5.30 M/uL    HGB 9.2 (L) 12.0 - 16.0 g/dL    HCT 28.8 (L) 35.0 - 45.0 %    MCV 85.0 74.0 - 97.0 FL    MCH 27.1 24.0 - 34.0 PG    MCHC 31.9 31.0 - 37.0 g/dL    RDW 16.3 (H) 11.6 - 14.5 %    PLATELET 684 324 - 234 K/uL    MPV 9.5 9.2 - 11.8 FL    NEUTROPHILS 49 40 - 73 %    LYMPHOCYTES 37 21 - 52 %    MONOCYTES 8 3 - 10 %    EOSINOPHILS 5 0 - 5 %    BASOPHILS 1 0 - 2 %    ABS. NEUTROPHILS 1.9 1.8 - 8.0 K/UL    ABS. LYMPHOCYTES 1.4 0.9 - 3.6 K/UL    ABS. MONOCYTES 0.3 0.05 - 1.2 K/UL    ABS. EOSINOPHILS 0.2 0.0 - 0.4 K/UL    ABS. BASOPHILS 0.0 0.0 - 0.1 K/UL    DF AUTOMATED     METABOLIC PANEL, COMPREHENSIVE    Collection Time: 10/21/17  1:45 AM   Result Value Ref Range    Sodium 137 136 - 145 mmol/L    Potassium 2.8 (LL) 3.5 - 5.5 mmol/L    Chloride 104 100 - 108 mmol/L    CO2 28 21 - 32 mmol/L    Anion gap 5 3.0 - 18 mmol/L    Glucose 78 74 - 99 mg/dL    BUN 24 (H) 7.0 - 18 MG/DL    Creatinine 1.69 (H) 0.6 - 1.3 MG/DL    BUN/Creatinine ratio 14 12 - 20      GFR est AA 39 (L) >60 ml/min/1.73m2    GFR est non-AA 32 (L) >60 ml/min/1.73m2    Calcium 8.2 (L) 8.5 - 10.1 MG/DL    Bilirubin, total 0.3 0.2 - 1.0 MG/DL    ALT (SGPT) 30 13 - 56 U/L    AST (SGOT) 31 15 - 37 U/L    Alk. phosphatase 128 (H) 45 - 117 U/L    Protein, total 8.6 (H) 6.4 - 8.2 g/dL    Albumin 3.3 (L) 3.4 - 5.0 g/dL    Globulin 5.3 (H) 2.0 - 4.0 g/dL    A-G Ratio 0.6 (L) 0.8 - 1.7     MAGNESIUM    Collection Time: 10/21/17  1:45 AM   Result Value Ref Range    Magnesium 1.8 1.6 - 2.6 mg/dL       EKG interpretation by ED Physician:    2:24 AM- NSR, 68 bpm, no acute ST changes, normal intervals     X-Ray, CT or other radiology findings or impressions:    Ct Head Wo Cont  IMPRESSION: Stable exam. Stable nonspecific white matter findings, most often related to chronic microvascular disease.       Progress notes, Consult notes or additional Procedure notes:       MDM: Purulent cellulitis of bilat lower extremities, I&D at bedside, no signs of sepsis. CT head neg, HTN due to med noncompliance, improving. Medically cleared, will discuss with psych    4:17 AM Consult: I discussed care with Steph (Crisis Management). It was a standard discussion including patient history, chief complaint, available diagnostic results, and predicted treatment course. Will evaluate pt. Disposition:  Diagnosis:   1. Anxiety    2. Hypertension, unspecified type    3. Abscess of lower leg    4. Hypokalemia        Disposition:    Follow-up Information     None           Patient's Medications   Start Taking    No medications on file   Continue Taking    AMLODIPINE (NORVASC) 10 MG TABLET    Take 1 Tab by mouth daily. LISINOPRIL (PRINIVIL, ZESTRIL) 20 MG TABLET    Take 1 Tab by mouth daily. METOPROLOL TARTRATE (LOPRESSOR) 50 MG TABLET    Take 1 Tab by mouth two (2) times a day. TRAZODONE (DESYREL) 150 MG TABLET    Take 150 mg by mouth nightly. These Medications have changed    No medications on file   Stop Taking    No medications on file         Scribe Attestation      Lissy Foley (Aj) acting as a scribe for and in the presence of Viraj Rick MD      October 21, 2017 at 1:25 AM       Provider Attestation:      I personally performed the services described in the documentation, reviewed the documentation, as recorded by the scribe in my presence, and it accurately and completely records my words and actions.  October 21, 2017 at 1:25 AM - Viraj Rick MD

## 2017-10-21 NOTE — ED NOTES
Bedside report received from [facility], [address] [place] [address]. Pt currently resting in bed. VSS. Pt denies SI or HI at this time. Pt contracts for her safety while in the ED.

## 2017-10-21 NOTE — BSMART NOTE
49 yo disheveled F seen in ED room 12 at the request of . Alert & O x 4, cooperative, coherent conversation,  fair eye contact, appears depressed, adequate hygiene, memory & judgement currently intact, insight fair to poor. Unemployed, lives with her niece. CC: \"So tired, suicidal thoughts x month, can't stop drugs\"    Came to ED today with thoughts of suicide off & on X one month. Had plan recently to overdose. States nothing different today, just tired of living like she is living on drugs. No prior suicide attempts. Denies homicidal ideation. Denies A / V hallucinations, in past has heard a voice calling her name. Denies any alcohol use. Injects cocaine and heroin. Cocaine once or so per day and about 10 caps heroin. \"Gets money from friends and stuff. \" use amount varies sometimes. Past (few years ago) tx at [facility] for 30 days. At [address] [place] [address] July 2017. Had one week clean and became very angry at how [facility] counselor was treating her and used that anger as excuse to relapse. Using steadily since that time. Appetite and sleep \"OK\" Being tx'd at [facility] clinic for hypertension w/ Lisinopril, Lopressor & Norvasc, states she takes her medication. Discharged from [address] [place] [address] on Seroquel, did not continue that after discharge. Leg abscess (injection site) I&D while in ED & antibiotics started. Voluntary for treatment at any location, states she does not feel safe to continue as her life is going out in the community, contracts that she will not harm herself while in hospital or treatment facility. DISPOSITION: Discussed with . Will contact on-call [phone] clinician for possible [facility] or [facility] placement options. [facility] clinician currently backed up with 4 pending ([facility]) cases, which take priority. Will keep ED/CI informed prn.

## 2017-10-21 NOTE — ED NOTES
Pt to ED room 12, pt changed into ED paper safety scrubs. Belongings collected and placed in ED locker 2 C.

## 2017-10-21 NOTE — ED TRIAGE NOTES
Patient reports that she has been having issues with a headache and high blood pressure. Patient also reports that she has been having issues with drug use and wants to harm herself. Patient also reports right leg pain and states she feels like she has an abscess on it. Patient reports using heroin and cocaine earlier today.

## 2017-10-22 ENCOUNTER — APPOINTMENT (OUTPATIENT)
Dept: GENERAL RADIOLOGY | Age: 47
End: 2017-10-22
Attending: EMERGENCY MEDICINE
Payer: SELF-PAY

## 2017-10-22 LAB
ATRIAL RATE: 68 BPM
CALCULATED P AXIS, ECG09: 63 DEGREES
CALCULATED R AXIS, ECG10: 55 DEGREES
CALCULATED T AXIS, ECG11: 58 DEGREES
DIAGNOSIS, 93000: NORMAL
P-R INTERVAL, ECG05: 186 MS
Q-T INTERVAL, ECG07: 428 MS
QRS DURATION, ECG06: 96 MS
QTC CALCULATION (BEZET), ECG08: 455 MS
VENTRICULAR RATE, ECG03: 68 BPM

## 2017-10-22 PROCEDURE — 74011000250 HC RX REV CODE- 250: Performed by: EMERGENCY MEDICINE

## 2017-10-22 PROCEDURE — 74011250637 HC RX REV CODE- 250/637: Performed by: EMERGENCY MEDICINE

## 2017-10-22 PROCEDURE — 73590 X-RAY EXAM OF LOWER LEG: CPT

## 2017-10-22 RX ORDER — AMLODIPINE BESYLATE 10 MG/1
10 TABLET ORAL DAILY
Status: DISCONTINUED | OUTPATIENT
Start: 2017-10-23 | End: 2017-10-23 | Stop reason: HOSPADM

## 2017-10-22 RX ORDER — LISINOPRIL 20 MG/1
20 TABLET ORAL
Status: COMPLETED | OUTPATIENT
Start: 2017-10-22 | End: 2017-10-22

## 2017-10-22 RX ORDER — METOPROLOL TARTRATE 50 MG/1
50 TABLET ORAL 2 TIMES DAILY
Status: DISCONTINUED | OUTPATIENT
Start: 2017-10-23 | End: 2017-10-22 | Stop reason: SDUPTHER

## 2017-10-22 RX ORDER — POTASSIUM CHLORIDE 1.5 G/1.77G
40 POWDER, FOR SOLUTION ORAL 2 TIMES DAILY WITH MEALS
Status: DISCONTINUED | OUTPATIENT
Start: 2017-10-22 | End: 2017-10-23 | Stop reason: HOSPADM

## 2017-10-22 RX ORDER — METOPROLOL TARTRATE 5 MG/5ML
2.5 INJECTION INTRAVENOUS ONCE
Status: ACTIVE | OUTPATIENT
Start: 2017-10-22 | End: 2017-10-22

## 2017-10-22 RX ORDER — LISINOPRIL 20 MG/1
20 TABLET ORAL DAILY
Status: DISCONTINUED | OUTPATIENT
Start: 2017-10-23 | End: 2017-10-23 | Stop reason: HOSPADM

## 2017-10-22 RX ORDER — LORAZEPAM 1 MG/1
1 TABLET ORAL
Status: DISCONTINUED | OUTPATIENT
Start: 2017-10-22 | End: 2017-10-23 | Stop reason: HOSPADM

## 2017-10-22 RX ORDER — AMLODIPINE BESYLATE 10 MG/1
10 TABLET ORAL
Status: COMPLETED | OUTPATIENT
Start: 2017-10-22 | End: 2017-10-22

## 2017-10-22 RX ORDER — METOPROLOL TARTRATE 50 MG/1
50 TABLET ORAL 2 TIMES DAILY
Status: DISCONTINUED | OUTPATIENT
Start: 2017-10-22 | End: 2017-10-23 | Stop reason: HOSPADM

## 2017-10-22 RX ADMIN — SULFAMETHOXAZOLE AND TRIMETHOPRIM 1 TABLET: 400; 80 TABLET ORAL at 18:01

## 2017-10-22 RX ADMIN — METOPROLOL TARTRATE 50 MG: 50 TABLET ORAL at 18:01

## 2017-10-22 RX ADMIN — POTASSIUM CHLORIDE 40 MEQ: 1.5 POWDER, FOR SOLUTION ORAL at 03:17

## 2017-10-22 RX ADMIN — METOPROLOL TARTRATE 50 MG: 50 TABLET ORAL at 09:37

## 2017-10-22 RX ADMIN — LORAZEPAM 1 MG: 1 TABLET ORAL at 12:47

## 2017-10-22 RX ADMIN — POTASSIUM CHLORIDE 40 MEQ: 1.5 POWDER, FOR SOLUTION ORAL at 08:38

## 2017-10-22 RX ADMIN — POTASSIUM CHLORIDE 40 MEQ: 1.5 POWDER, FOR SOLUTION ORAL at 18:01

## 2017-10-22 RX ADMIN — LORAZEPAM 1 MG: 1 TABLET ORAL at 03:17

## 2017-10-22 RX ADMIN — SULFAMETHOXAZOLE AND TRIMETHOPRIM 1 TABLET: 400; 80 TABLET ORAL at 09:37

## 2017-10-22 RX ADMIN — AMLODIPINE BESYLATE 10 MG: 10 TABLET ORAL at 09:37

## 2017-10-22 RX ADMIN — LISINOPRIL 20 MG: 20 TABLET ORAL at 12:50

## 2017-10-22 NOTE — BSMART NOTE
Per Luis Fernando SANCHEZ clinician: Accepted for admission at  [facility] by Martina Hodges for arrival on Monday, 10/23/17 at 0930 am.

## 2017-10-22 NOTE — ED NOTES
Received report from Palma Echeverria, [facility] and Marycruz Bynum RN.

## 2017-10-22 NOTE — ED NOTES
Hourly rounding complete. Safety  Pt resting   [  x]  On stretcher with side rails up and bed in locked position, call bell within reach  [  ]  Sitting in chair with casters locked, call bell within reach    Toileting  [ x ] pt denies need to use bathroom  [  ] pt assisted to bathroom  [  ] pt assisted with bedpan  [  ] pt independent to bathroom as needed    Ongoing Plan of Care  Plan of care and expected time for test and results reviewed with pt.     Pain Management / Comfort  [x ] dimmed lights  [  ] warm blanket provided  [  ] pain assessed  [  ] monitor alarms reviewed

## 2017-10-22 NOTE — ED NOTES
Hourly rounding complete. Safety  Pt resting   x[  ]  On stretcher with side rails up and bed in locked position, call bell within reach  [  ]  Sitting in chair with casters locked, call bell within reach    Toileting  [ x ] pt denies need to use bathroom  [  ] pt assisted to bathroom  [  ] pt assisted with bedpan  [  ] pt independent to bathroom as needed    Ongoing Plan of Care  Plan of care and expected time for test and results reviewed with pt.     Pain Management / Comfort  [  ] dimmed lights  [x  ] warm blanket provided  [  ] pain assessed  [  ] monitor alarms reviewed

## 2017-10-22 NOTE — ED NOTES
Hourly rounding complete. Safety  Pt resting   [ x ]  On stretcher with side rails up and bed in locked position, call bell within reach  [  ]  Sitting in chair with casters locked, call bell within reach    Toileting  [ x ] pt denies need to use bathroom  [  ] pt assisted to bathroom  [  ] pt assisted with bedpan  [  ] pt independent to bathroom as needed    Ongoing Plan of Care  Plan of care and expected time for test and results reviewed with pt.     Pain Management / Comfort  [x  ] dimmed lights  [  ] warm blanket provided  [  ] pain assessed  [  ] monitor alarms reviewed

## 2017-10-22 NOTE — ED NOTES
Hourly rounding complete. Safety  Pt resting   [x  ]  On stretcher with side rails up and bed in locked position, call bell within reach  [  ]  Sitting in chair with casters locked, call bell within reach    Toileting  [  ] pt denies need to use bathroom  [  ] pt assisted to bathroom  [  ] pt assisted with bedpan  [x  ] pt independent to bathroom as needed    Ongoing Plan of Care  Plan of care and expected time for test and results reviewed with pt.     Pain Management / Comfort  [  ] dimmed lights  [ x ] warm blanket provided  [  ] pain assessed  [  ] monitor alarms reviewed

## 2017-10-22 NOTE — ED NOTES
Assumed care of patient from David [address]. Pt is on stretcher, in blue scrubs with red socks. Pt is awaiting placement at this time. Will continue safety checks.

## 2017-10-22 NOTE — ED NOTES
Hourly rounding complete. Safety  Pt resting   [x  ]  On stretcher with side rails up and bed in locked position, call bell within reach  [  ]  Sitting in chair with casters locked, call bell within reach    Toileting  [ x ] pt denies need to use bathroom  [  ] pt assisted to bathroom  [  ] pt assisted with bedpan  [  ] pt independent to bathroom as needed    Ongoing Plan of Care  Plan of care and expected time for test and results reviewed with pt.     Pain Management / Comfort  [ x ] dimmed lights  [  ] warm blanket provided  [  ] pain assessed  [  ] monitor alarms reviewed

## 2017-10-22 NOTE — ED NOTES
9:28 AM : Pt care transferred to Dr. Renetta Martin, ED provider. History of patient complaint(s), available diagnostic reports and current treatment plan has been discussed thoroughly. Bedside rounding on patient occured : yes . Intended disposition of patient : TBD  Pending diagnostics reports and/or labs (please list): placement    9:29 AM  Patient currently sleeping. Asymptomatic hypertension home drugs ordered. 12:17 PM patient accepted to [facility] tomorrow morning at 930am by Dr Trini Denny. Will arrange transport for tomorrow am and monitor for any changes or complaints. Patient's care signed over to Dr Lawanda Hanson pending transfer in the morning. Scribe [address] [place] [address] acting as a scribe for and in the presence of Elizabeth Aguilar MD      October 22, 2017 at 9:28 AM       Provider Attestation:      I personally performed the services described in the documentation, reviewed the documentation, as recorded by the scribe in my presence, and it accurately and completely records my words and actions.  October 22, 2017 at 9:28 AM - Elizabeth Aguilar MD

## 2017-10-22 NOTE — ED NOTES
Hourly rounding complete. Safety  Pt resting   [x  ]  On stretcher with side rails up and bed in locked position, call bell within reach  [  ]  Sitting in chair with casters locked, call bell within reach    Toileting  [  ] pt denies need to use bathroom  [  ] pt assisted to bathroom  [  ] pt assisted with bedpan  [x  ] pt independent to bathroom as needed    Ongoing Plan of Care  Plan of care and expected time for test and results reviewed with pt.     Pain Management / Comfort  [ x ] dimmed lights  [  ] warm blanket provided  [  ] pain assessed  [  ] monitor alarms reviewed

## 2017-10-22 NOTE — ED NOTES
Hourly rounding complete. Safety  Pt resting   [ x  On stretcher with side rails up and bed in locked position, call bell within reach  [  ]  Sitting in chair with casters locked, call bell within reach    Toileting  [  ] pt denies need to use bathroom  [  ] pt assisted to bathroom  [  ] pt assisted with bedpan  [x  ] pt independent to bathroom as needed    Ongoing Plan of Care  Plan of care and expected time for test and results reviewed with pt.     Pain Management / Comfort  [x  ] dimmed lights  [  ] warm blanket provided  [  ] pain assessed  [  ] monitor alarms reviewed

## 2017-10-22 NOTE — ED NOTES
Hourly rounding complete. Safety  Pt resting   [ x ]  On stretcher with side rails up and bed in locked position, call bell within reach  [  ]  Sitting in chair with casters locked, call bell within reach    Toileting  [ x ] pt denies need to use bathroom  [  ] pt assisted to bathroom  [  ] pt assisted with bedpan  [  ] pt independent to bathroom as needed    Ongoing Plan of Care  Plan of care and expected time for test and results reviewed with pt.     Pain Management / Comfort  [ x ] dimmed lights  [  ] warm blanket provided  [  ] pain assessed  [  ] monitor alarms reviewed

## 2017-10-23 VITALS
TEMPERATURE: 98.2 F | DIASTOLIC BLOOD PRESSURE: 98 MMHG | HEART RATE: 72 BPM | RESPIRATION RATE: 16 BRPM | SYSTOLIC BLOOD PRESSURE: 154 MMHG | OXYGEN SATURATION: 100 %

## 2017-10-23 LAB
BACTERIA SPEC CULT: ABNORMAL
GRAM STN SPEC: ABNORMAL
GRAM STN SPEC: ABNORMAL
SERVICE CMNT-IMP: ABNORMAL

## 2017-10-23 PROCEDURE — 74011250637 HC RX REV CODE- 250/637: Performed by: EMERGENCY MEDICINE

## 2017-10-23 RX ADMIN — LORAZEPAM 1 MG: 1 TABLET ORAL at 07:12

## 2017-10-23 RX ADMIN — LISINOPRIL 20 MG: 20 TABLET ORAL at 07:08

## 2017-10-23 RX ADMIN — POTASSIUM CHLORIDE 40 MEQ: 1.5 POWDER, FOR SOLUTION ORAL at 07:09

## 2017-10-23 RX ADMIN — AMLODIPINE BESYLATE 10 MG: 10 TABLET ORAL at 07:08

## 2017-10-23 NOTE — ED NOTES
Bedside shift change report given to Jonel Dolan RN (oncoming nurse) by Alvaro Valenzuela RN (offgoing nurse). Report included the following information SBAR, ED Summary, MAR and Recent Results.

## 2017-10-23 NOTE — ED NOTES
TRANSFER - OUT REPORT:    Verbal report given to Silverio Carr RN on Lynetta Olszewski  being transferred to [facility] (unit) for routine progression of care       Report consisted of patients Situation, Background, Assessment and   Recommendations(SBAR). Information from the following report(s) SBAR, ED Summary, [facility] and Recent Results was reviewed with the receiving nurse. Lines:       Opportunity for questions and clarification was provided.       Patient transported with:   S medical transport

## 2018-10-17 ENCOUNTER — TELEPHONE (OUTPATIENT)
Dept: FAMILY MEDICINE CLINIC | Age: 48
End: 2018-10-17

## 2018-10-17 DIAGNOSIS — I10 ESSENTIAL HYPERTENSION: ICD-10-CM

## 2018-10-17 NOTE — TELEPHONE ENCOUNTER
Dr. Willow Lacy,     Patient called the [address] [place] so she could request a refill on her BP meds. She stated she was a patient of the [address] [place] [address]. She was last seen by Ai Damico in August 2017. I did not put in a med refill request because it has been over a year. Would you like one of the MAs to contact the patient and tell her she needs to come in to be seen before her meds can be refilled?

## 2018-10-18 RX ORDER — LISINOPRIL 20 MG/1
20 TABLET ORAL DAILY
Qty: 30 TAB | Refills: 0 | Status: SHIPPED | OUTPATIENT
Start: 2018-10-18 | End: 2018-12-05 | Stop reason: SDUPTHER

## 2018-10-18 RX ORDER — AMLODIPINE BESYLATE 10 MG/1
10 TABLET ORAL DAILY
Qty: 30 TAB | Refills: 0 | Status: SHIPPED | OUTPATIENT
Start: 2018-10-18 | End: 2018-12-05 | Stop reason: SDUPTHER

## 2018-10-18 RX ORDER — METOPROLOL TARTRATE 50 MG/1
50 TABLET ORAL 2 TIMES DAILY
Qty: 60 TAB | Refills: 0 | Status: SHIPPED | OUTPATIENT
Start: 2018-10-18 | End: 2018-12-05 | Stop reason: SDUPTHER

## 2018-12-05 ENCOUNTER — OFFICE VISIT (OUTPATIENT)
Dept: FAMILY MEDICINE CLINIC | Age: 48
End: 2018-12-05

## 2018-12-05 VITALS
DIASTOLIC BLOOD PRESSURE: 94 MMHG | HEART RATE: 70 BPM | SYSTOLIC BLOOD PRESSURE: 141 MMHG | TEMPERATURE: 97.1 F | HEIGHT: 68 IN | OXYGEN SATURATION: 99 % | WEIGHT: 132 LBS | BODY MASS INDEX: 20 KG/M2 | RESPIRATION RATE: 17 BRPM

## 2018-12-05 DIAGNOSIS — I10 ESSENTIAL HYPERTENSION: Primary | ICD-10-CM

## 2018-12-05 RX ORDER — METOPROLOL TARTRATE 50 MG/1
50 TABLET ORAL 2 TIMES DAILY
Qty: 60 TAB | Refills: 5 | Status: SHIPPED | OUTPATIENT
Start: 2018-12-05 | End: 2019-06-06 | Stop reason: SDUPTHER

## 2018-12-05 RX ORDER — AMLODIPINE BESYLATE 10 MG/1
10 TABLET ORAL DAILY
Qty: 30 TAB | Refills: 5 | Status: SHIPPED | OUTPATIENT
Start: 2018-12-05

## 2018-12-05 RX ORDER — LISINOPRIL 20 MG/1
20 TABLET ORAL DAILY
Qty: 30 TAB | Refills: 5 | Status: SHIPPED | OUTPATIENT
Start: 2018-12-05 | End: 2019-06-06 | Stop reason: SDUPTHER

## 2018-12-05 RX ORDER — TRAZODONE HYDROCHLORIDE 150 MG/1
150 TABLET ORAL
Qty: 30 TAB | Refills: 1 | Status: SHIPPED | OUTPATIENT
Start: 2018-12-05 | End: 2019-02-21 | Stop reason: SDUPTHER

## 2018-12-05 NOTE — PROGRESS NOTES
HPI  Trish Churchill is a 46 y.o. female   Chief Complaint   Patient presents with    Blood Pressure Check   . she states that being seen today for BP medication refill  and a concern with left lip and left cheek numbness x several weeks. She denies weakness and visual changes. No CP or SOB. Denies substance abuse. Reports feeling bored, some depression but doesn't want any medication at this time. NKA. Past Medical History:   Diagnosis Date    Hypercholesterolemia     Hyperlipidemia     Hypertension     Other ill-defined conditions(799.89)     sinus    Other ill-defined conditions(799.89)     hypercholesteral         ROS  Patient states that she is feeling well. Denies complaints of chest pain, shortness of breath, swelling of legs, dizziness or weakness. she denies nausea, vomiting or diarrhea. Current Outpatient Medications   Medication Sig    traZODone (DESYREL) 150 mg tablet Take 1 Tab by mouth nightly.  lisinopril (PRINIVIL, ZESTRIL) 20 mg tablet Take 1 Tab by mouth daily.  amLODIPine (NORVASC) 10 mg tablet Take 1 Tab by mouth daily.  metoprolol tartrate (LOPRESSOR) 50 mg tablet Take 1 Tab by mouth two (2) times a day. No current facility-administered medications for this visit. PE  Visit Vitals  BP (!) 141/94 (BP 1 Location: Left arm, BP Patient Position: Sitting)   Pulse 70   Temp 97.1 °F (36.2 °C) (Temporal)   Resp 17   Ht 5' 8\" (1.727 m)   Wt 132 lb (59.9 kg)   SpO2 99%   BMI 20.07 kg/m²        Alert and oriented with normal mood and affect. she is well developed and well nourished . Lungs are clear without wheezing. Heart rate is regular without murmurs or gallops. There is no lower extremity edema  CN system intact  HEENT- mucosal membrane intact to lips, mouth            Assessment and Plan:        ICD-10-CM ICD-9-CM    1.  Essential hypertension I10 401.9 TSH 3RD GENERATION      CBC WITH AUTOMATED DIFF      HEMOGLOBIN A1C WITH EAG      METABOLIC PANEL, COMPREHENSIVE      lisinopril (PRINIVIL, ZESTRIL) 20 mg tablet      amLODIPine (NORVASC) 10 mg tablet      metoprolol tartrate (LOPRESSOR) 50 mg tablet     Labs today  Refill meds  No obvious reason for left lip numbness. Exam is reassuring.   Monitor for now      Ashley Guerra MD

## 2019-02-20 ENCOUNTER — HOSPITAL ENCOUNTER (EMERGENCY)
Age: 49
Discharge: HOME | End: 2019-02-20
Attending: EMERGENCY MEDICINE
Payer: MEDICAID

## 2019-02-20 VITALS
SYSTOLIC BLOOD PRESSURE: 172 MMHG | HEART RATE: 65 BPM | DIASTOLIC BLOOD PRESSURE: 91 MMHG | OXYGEN SATURATION: 100 % | RESPIRATION RATE: 18 BRPM | TEMPERATURE: 98.6 F

## 2019-02-20 DIAGNOSIS — F19.90 IVDU (INTRAVENOUS DRUG USER): ICD-10-CM

## 2019-02-20 DIAGNOSIS — L03.119 CELLULITIS AND ABSCESS OF LEG, EXCEPT FOOT: Primary | ICD-10-CM

## 2019-02-20 DIAGNOSIS — L02.419 CELLULITIS AND ABSCESS OF LEG, EXCEPT FOOT: Primary | ICD-10-CM

## 2019-02-20 PROCEDURE — 74011250637 HC RX REV CODE- 250/637: Performed by: EMERGENCY MEDICINE

## 2019-02-20 PROCEDURE — 75810000289 HC I&D ABSCESS SIMP/COMP/MULT

## 2019-02-20 PROCEDURE — 99283 EMERGENCY DEPT VISIT LOW MDM: CPT

## 2019-02-20 PROCEDURE — 77030019895 HC PCKNG STRP IODO -A

## 2019-02-20 RX ORDER — SULFAMETHOXAZOLE AND TRIMETHOPRIM 800; 160 MG/1; MG/1
2 TABLET ORAL
Status: COMPLETED | OUTPATIENT
Start: 2019-02-20 | End: 2019-02-20

## 2019-02-20 RX ORDER — SULFAMETHOXAZOLE AND TRIMETHOPRIM 800; 160 MG/1; MG/1
2 TABLET ORAL 2 TIMES DAILY
Qty: 40 TAB | Refills: 0 | Status: SHIPPED | OUTPATIENT
Start: 2019-02-20 | End: 2019-03-02

## 2019-02-20 RX ORDER — IBUPROFEN 400 MG/1
800 TABLET ORAL
Status: COMPLETED | OUTPATIENT
Start: 2019-02-20 | End: 2019-02-20

## 2019-02-20 RX ORDER — CEPHALEXIN 250 MG/1
500 CAPSULE ORAL
Status: COMPLETED | OUTPATIENT
Start: 2019-02-20 | End: 2019-02-20

## 2019-02-20 RX ORDER — IBUPROFEN 800 MG/1
800 TABLET ORAL EVERY 8 HOURS
Qty: 15 TAB | Refills: 0 | Status: SHIPPED | OUTPATIENT
Start: 2019-02-20 | End: 2019-02-25

## 2019-02-20 RX ORDER — CEPHALEXIN 500 MG/1
500 CAPSULE ORAL 4 TIMES DAILY
Qty: 40 CAP | Refills: 0 | Status: SHIPPED | OUTPATIENT
Start: 2019-02-20 | End: 2019-03-02

## 2019-02-20 RX ADMIN — IBUPROFEN 800 MG: 400 TABLET ORAL at 13:15

## 2019-02-20 RX ADMIN — CEPHALEXIN 500 MG: 250 CAPSULE ORAL at 13:15

## 2019-02-20 RX ADMIN — SULFAMETHOXAZOLE AND TRIMETHOPRIM 2 TABLET: 800; 160 TABLET ORAL at 13:15

## 2019-02-20 NOTE — ED PROVIDER NOTES
EMERGENCY DEPARTMENT HISTORY AND PHYSICAL EXAM 
 
12:34 PM 
 
 
Date: 2/20/2019 Patient Name: Reena Lemus History of Presenting Illness Chief Complaint Patient presents with  Insect Bite History Provided By: Patient Chief Complaint: insect bite Duration: 2-3 days Timing: worsening Location:  Right lower leg Quality: increasing in size Severity: not reported Modifying Factors: she has not self-medicated Associated Symptoms: no fevers or chills. Additional History (Context): Reena Lemus is a 44 y.o. female with a history of HTN presenting to the ED for the evaluation of an insect bite to her right lower leg that she noticed 2-3 days ago and has been progressively worsening and increasing in size. Patient notes that it started off pruritic but is now becoming more sore and painful. She has not self-medicated and denies any recent sick contact. Denies history of similar. No fevers, chills. PCP: Dave Diggs MD 
 
Current Facility-Administered Medications Medication Dose Route Frequency Provider Last Rate Last Dose  ibuprofen (MOTRIN) tablet 800 mg  800 mg Oral NOW Myah Ruff PA      
 cephALEXin (KEFLEX) capsule 500 mg  500 mg Oral NOW Myah Ruff PA      
 trimethoprim-sulfamethoxazole (BACTRIM DS, SEPTRA DS) 160-800 mg per tablet 2 Tab  2 Tab Oral NOW Myah Ruff PA Current Outpatient Medications Medication Sig Dispense Refill  cephALEXin (KEFLEX) 500 mg capsule Take 1 Cap by mouth four (4) times daily for 10 days. 40 Cap 0  
 trimethoprim-sulfamethoxazole (BACTRIM DS) 160-800 mg per tablet Take 2 Tabs by mouth two (2) times a day for 10 days. 40 Tab 0  ibuprofen (MOTRIN) 800 mg tablet Take 1 Tab by mouth every eight (8) hours for 5 days. 15 Tab 0  
 traZODone (DESYREL) 150 mg tablet Take 1 Tab by mouth nightly. 30 Tab 1  
 lisinopril (PRINIVIL, ZESTRIL) 20 mg tablet Take 1 Tab by mouth daily. 30 Tab 5  amLODIPine (NORVASC) 10 mg tablet Take 1 Tab by mouth daily. 30 Tab 5  
 metoprolol tartrate (LOPRESSOR) 50 mg tablet Take 1 Tab by mouth two (2) times a day. 60 Tab 5 Past History Past Medical History: 
Past Medical History:  
Diagnosis Date  Hypercholesterolemia  Hyperlipidemia  Hypertension  Other ill-defined conditions(799.89) sinus  Other ill-defined conditions(799.89)   
 hypercholesteral  
 
 
Past Surgical History: 
Past Surgical History:  
Procedure Laterality Date  HX HYSTERECTOMY Family History: 
Family History Problem Relation Age of Onset  Diabetes Mother  Diabetes Brother Social History: 
Social History Tobacco Use  Smoking status: Current Every Day Smoker Packs/day: 0.50 Types: Cigarettes  Smokeless tobacco: Never Used Substance Use Topics  Alcohol use: No  
  Alcohol/week: 0.0 oz  Drug use: Yes Frequency: 5.0 times per week Types: Heroin Allergies: 
No Known Allergies Review of Systems Review of Systems Constitutional: Negative for chills and fever. Skin:  
     (+) \"insect bite\" to right lower leg All other systems reviewed and are negative. Physical Exam  
 
Visit Vitals BP (!) 172/91 Pulse 65 Temp 98.6 °F (37 °C) Resp 18 SpO2 100% Physical Exam  
Constitutional: She is oriented to person, place, and time. She appears well-developed. HENT:  
Head: Normocephalic and atraumatic. Eyes: Pupils are equal, round, and reactive to light. Neck: No JVD present. No tracheal deviation present. No thyromegaly present. Cardiovascular: Normal rate, regular rhythm and normal heart sounds. Exam reveals no gallop and no friction rub. No murmur heard. Pulmonary/Chest: Effort normal and breath sounds normal. No stridor. No respiratory distress. She has no wheezes. She has no rales. She exhibits no tenderness. Abdominal: Soft. She exhibits no distension and no mass. There is no tenderness. There is no rebound and no guarding. Musculoskeletal: She exhibits no edema or tenderness. Lymphadenopathy:  
  She has no cervical adenopathy. Neurological: She is alert and oriented to person, place, and time. Skin: Skin is warm and dry. Rash noted. No erythema. No pallor. 2cm diameter abscess, tender, anterior lower right leg. Cellulitic redness extending another cm diameter. Psychiatric: She has a normal mood and affect. Her behavior is normal. Thought content normal.  
Nursing note and vitals reviewed. Diagnostic Study Results Labs - No results found for this or any previous visit (from the past 12 hour(s)). Radiologic Studies - No orders to display Medical Decision Making It should be noted that IWindy PA will be the provider of record for this patient. I reviewed the vital signs, available nursing notes, past medical history, past surgical history, family history and social history. Vital Signs - Reviewed the patient's vital signs. Records Reviewed: Nursing Notes (Time of Review: 12:34 PM) 
 
ED Course: Progress Notes, Reevaluation, and Consults: 
 
Procedures: 
I&D Abcess Complex Date/Time: 2/20/2019 1:07 PM 
Performed by: TARA Wing Authorized by: TARA Wing Consent:  
  Consent obtained:  Verbal 
  Consent given by:  Patient Risks discussed:  Pain and infection Alternatives discussed:  Alternative treatment Location:  
  Type:  Abscess Size:  2cm Location:  Lower extremity Lower extremity location:  Leg Leg location:  R lower leg Pre-procedure details:  
  Skin preparation:  Betadine Anesthesia (see MAR for exact dosages): Anesthesia method:  Local infiltration Local anesthetic:  Lidocaine 1% w/o epi Procedure type:  
  Complexity:  Complex Procedure details:  
  Needle aspiration: no   
 Incision types:  Single with marsupialization Incision depth:  Dermal 
  Scalpel blade:  11 Wound management:  Probed and deloculated and irrigated with saline Drainage:  Purulent Drainage amount: Moderate Packing materials:  1/4 in iodoform gauze Amount 1/4\" iodoform:  2 Post-procedure details:  
  Patient tolerance of procedure: Tolerated well, no immediate complications Provider Notes (Medical Decision Making): MDM  
 
I&D performed. IVDU with h/o MRSA. Keflex and Bactrim DS ABX initiated here. MED RECONCILIATION: 
Current Facility-Administered Medications Medication Dose Route Frequency  ibuprofen (MOTRIN) tablet 800 mg  800 mg Oral NOW  cephALEXin (KEFLEX) capsule 500 mg  500 mg Oral NOW  trimethoprim-sulfamethoxazole (BACTRIM DS, SEPTRA DS) 160-800 mg per tablet 2 Tab  2 Tab Oral NOW Current Outpatient Medications Medication Sig  cephALEXin (KEFLEX) 500 mg capsule Take 1 Cap by mouth four (4) times daily for 10 days.  trimethoprim-sulfamethoxazole (BACTRIM DS) 160-800 mg per tablet Take 2 Tabs by mouth two (2) times a day for 10 days.  ibuprofen (MOTRIN) 800 mg tablet Take 1 Tab by mouth every eight (8) hours for 5 days.  traZODone (DESYREL) 150 mg tablet Take 1 Tab by mouth nightly.  lisinopril (PRINIVIL, ZESTRIL) 20 mg tablet Take 1 Tab by mouth daily.  amLODIPine (NORVASC) 10 mg tablet Take 1 Tab by mouth daily.  metoprolol tartrate (LOPRESSOR) 50 mg tablet Take 1 Tab by mouth two (2) times a day. Disposition: 
home DISCHARGE NOTE:  
1:08 PM 
 
Pt has been reexamined. Patient has no new complaints, changes, or physical findings. Care plan outlined and precautions discussed. Results of exam were reviewed with the patient. All medications were reviewed with the patient; will d/c home with bactrim ds, keflex. All of pt's questions and concerns were addressed.  Patient was instructed and agrees to follow up with PCP, as well as to return to the ED upon further deterioration. Patient is ready to go home. Follow-up Information Follow up With Specialties Details Why Contact Info Joseph De Los Santos MD Internal Medicine Schedule an appointment as soon as possible for a visit in 2 days For wound re-check [id number] [facility] [address] 
[phone] [facility] EMERGENCY DEPT Emergency Medicine  If symptoms worsen return immediately Ivonne [address] Current Discharge Medication List  
  
START taking these medications Details  
cephALEXin (KEFLEX) 500 mg capsule Take 1 Cap by mouth four (4) times daily for 10 days. Qty: 40 Cap, Refills: 0  
  
trimethoprim-sulfamethoxazole (BACTRIM DS) 160-800 mg per tablet Take 2 Tabs by mouth two (2) times a day for 10 days. Qty: 40 Tab, Refills: 0  
  
ibuprofen (MOTRIN) 800 mg tablet Take 1 Tab by mouth every eight (8) hours for 5 days. Qty: 15 Tab, Refills: 0 Diagnosis Clinical Impression: 1. Cellulitis and abscess of leg, except foot 2. IVDU (intravenous drug user)   
 
 
_______________________________ Attestations: 
Scribe Attestation Wen Swan acting as a scribe for and in the presence of Cristhian Valentin February 20, 2019 at 12:34 PM 
    
Provider Attestation:     
I personally performed the services described in the documentation, reviewed the documentation, as recorded by the scribe in my presence, and it accurately and completely records my words and actions.  February 20, 2019 at 12:34 PM - TARA Valentin

## 2019-02-20 NOTE — DISCHARGE INSTRUCTIONS
Patient Education        Skin Abscess: Care Instructions  Your Care Instructions    A skin abscess is a bacterial infection that forms a pocket of pus. A boil is a kind of skin abscess. The doctor may have cut an opening in the abscess so that the pus can drain out. You may have gauze in the cut so that the abscess will stay open and keep draining. You may need antibiotics. You will need to follow up with your doctor to make sure the infection has gone away. The doctor has checked you carefully, but problems can develop later. If you notice any problems or new symptoms, get medical treatment right away. Follow-up care is a key part of your treatment and safety. Be sure to make and go to all appointments, and call your doctor if you are having problems. It's also a good idea to know your test results and keep a list of the medicines you take. How can you care for yourself at home? · Apply warm and dry compresses, a heating pad set on low, or a hot water bottle 3 or 4 times a day for pain. Keep a cloth between the heat source and your skin. · If your doctor prescribed antibiotics, take them as directed. Do not stop taking them just because you feel better. You need to take the full course of antibiotics. · Take pain medicines exactly as directed. ? If the doctor gave you a prescription medicine for pain, take it as prescribed. ? If you are not taking a prescription pain medicine, ask your doctor if you can take an over-the-counter medicine. · Keep your bandage clean and dry. Change the bandage whenever it gets wet or dirty, or at least one time a day. · If the abscess was packed with gauze:  ? Keep follow-up appointments to have the gauze changed or removed. If the doctor instructed you to remove the gauze, follow the instructions you were given for how to remove it. ? After the gauze is removed, soak the area in warm water for 15 to 20 minutes 2 times a day, until the wound closes.   When should you call for help? Call your doctor now or seek immediate medical care if:    · You have signs of worsening infection, such as:  ? Increased pain, swelling, warmth, or redness. ? Red streaks leading from the infected skin. ? Pus draining from the wound. ? A fever.    Watch closely for changes in your health, and be sure to contact your doctor if:    · You do not get better as expected. Where can you learn more? Go to http://esau-rosa.info/. Enter K415 in the search box to learn more about \"Skin Abscess: Care Instructions. \"  Current as of: April 17, 2018  Content Version: 11.9  © 6383-6081 [employer]. Care instructions adapted under license by [employer] (which disclaims liability or warranty for this information). If you have questions about a medical condition or this instruction, always ask your healthcare professional. [place] [address] any warranty or liability for your use of this information. Patient Education        Cellulitis: Care Instructions  Your Care Instructions    Cellulitis is a skin infection caused by bacteria, most often strep or staph. It often occurs after a break in the skin from a scrape, cut, bite, or puncture, or after a rash. Cellulitis may be treated without doing tests to find out what caused it. But your doctor may do tests, if needed, to look for a specific bacteria, like methicillin-resistant Staphylococcus aureus (MRSA). The doctor has checked you carefully, but problems can develop later. If you notice any problems or new symptoms, get medical treatment right away. Follow-up care is a key part of your treatment and safety. Be sure to make and go to all appointments, and call your doctor if you are having problems. It's also a good idea to know your test results and keep a list of the medicines you take. How can you care for yourself at home? · Take your antibiotics as directed.  Do not stop taking them just because you feel better. You need to take the full course of antibiotics. · Prop up the infected area on pillows to reduce pain and swelling. Try to keep the area above the level of your heart as often as you can. · If your doctor told you how to care for your wound, follow your doctor's instructions. If you did not get instructions, follow this general advice:  ? Wash the wound with clean water 2 times a day. Don't use hydrogen peroxide or alcohol, which can slow healing. ? You may cover the wound with a thin layer of petroleum jelly, such as Vaseline, and a nonstick bandage. ? Apply more petroleum jelly and replace the bandage as needed. · Be safe with medicines. Take pain medicines exactly as directed. ? If the doctor gave you a prescription medicine for pain, take it as prescribed. ? If you are not taking a prescription pain medicine, ask your doctor if you can take an over-the-counter medicine. To prevent cellulitis in the future  · Try to prevent cuts, scrapes, or other injuries to your skin. Cellulitis most often occurs where there is a break in the skin. · If you get a scrape, cut, mild burn, or bite, wash the wound with clean water as soon as you can to help avoid infection. Don't use hydrogen peroxide or alcohol, which can slow healing. · If you have swelling in your legs (edema), support stockings and good skin care may help prevent leg sores and cellulitis. · Take care of your feet, especially if you have diabetes or other conditions that increase the risk of infection. Wear shoes and socks. Do not go barefoot. If you have athlete's foot or other skin problems on your feet, talk to your doctor about how to treat them. When should you call for help? Call your doctor now or seek immediate medical care if:    · You have signs that your infection is getting worse, such as:  ? Increased pain, swelling, warmth, or redness. ? Red streaks leading from the area. ? Pus draining from the area. ?  A fever.     · You get a rash.    Watch closely for changes in your health, and be sure to contact your doctor if:    · You do not get better as expected. Where can you learn more? Go to http://esau-rosa.info/. Jg Yee in the search box to learn more about \"Cellulitis: Care Instructions. \"  Current as of: April 17, 2018  Content Version: 11.9  © 5123-2170 [employer]. Care instructions adapted under license by [employer] (which disclaims liability or warranty for this information). If you have questions about a medical condition or this instruction, always ask your healthcare professional. [address] any warranty or liability for your use of this information.

## 2019-02-20 NOTE — ED TRIAGE NOTES
Pt c/o insect bite to right lower leg x 3 days, started itchy at first but now painful and swollen with some redness to area.

## 2019-02-27 RX ORDER — TRAZODONE HYDROCHLORIDE 150 MG/1
TABLET ORAL
Qty: 30 TAB | Refills: 1 | Status: SHIPPED | OUTPATIENT
Start: 2019-02-27 | End: 2019-06-06 | Stop reason: SDUPTHER

## 2019-06-06 DIAGNOSIS — I10 ESSENTIAL HYPERTENSION: ICD-10-CM

## 2019-06-07 RX ORDER — LISINOPRIL 20 MG/1
TABLET ORAL
Qty: 30 TAB | Refills: 0 | Status: SHIPPED | OUTPATIENT
Start: 2019-06-07

## 2019-06-07 RX ORDER — METOPROLOL TARTRATE 50 MG/1
TABLET ORAL
Qty: 60 TAB | Refills: 0 | Status: SHIPPED | OUTPATIENT
Start: 2019-06-07

## 2019-06-07 RX ORDER — TRAZODONE HYDROCHLORIDE 150 MG/1
TABLET ORAL
Qty: 30 TAB | Refills: 0 | Status: SHIPPED | OUTPATIENT
Start: 2019-06-07 | End: 2019-12-02 | Stop reason: SDUPTHER

## 2019-12-03 RX ORDER — TRAZODONE HYDROCHLORIDE 150 MG/1
TABLET ORAL
Qty: 30 TAB | Refills: 0 | Status: SHIPPED | OUTPATIENT
Start: 2019-12-03

## 2019-12-25 ENCOUNTER — HOSPITAL ENCOUNTER (EMERGENCY)
Age: 49
Discharge: HOME | End: 2019-12-25
Attending: EMERGENCY MEDICINE
Payer: MEDICAID

## 2019-12-25 VITALS
TEMPERATURE: 99 F | DIASTOLIC BLOOD PRESSURE: 105 MMHG | BODY MASS INDEX: 25.11 KG/M2 | HEART RATE: 68 BPM | OXYGEN SATURATION: 99 % | HEIGHT: 67 IN | WEIGHT: 160 LBS | SYSTOLIC BLOOD PRESSURE: 181 MMHG | RESPIRATION RATE: 12 BRPM

## 2019-12-25 DIAGNOSIS — R03.0 ELEVATED BLOOD PRESSURE READING: Primary | ICD-10-CM

## 2019-12-25 PROCEDURE — 99281 EMR DPT VST MAYX REQ PHY/QHP: CPT

## 2019-12-25 NOTE — DISCHARGE INSTRUCTIONS
Patient Education        Elevated Blood Pressure: Care Instructions  Your Care Instructions    Blood pressure is a measure of how hard the blood pushes against the walls of your arteries. It's normal for blood pressure to go up and down throughout the day. But if it stays up over time, you have high blood pressure. Two numbers tell you your blood pressure. The first number is the systolic pressure. It shows how hard the blood pushes when your heart is pumping. The second number is the diastolic pressure. It shows how hard the blood pushes between heartbeats, when your heart is relaxed and filling with blood. An ideal blood pressure in adults is less than 120/80 (say \"120 over 80\"). High blood pressure is 140/90 or higher. You have high blood pressure if your top number is 140 or higher or your bottom number is 90 or higher, or both. The main test for high blood pressure is simple, fast, and painless. To diagnose high blood pressure, your doctor will test your blood pressure at different times. After testing your blood pressure, your doctor may ask you to test it again when you are home. If you are diagnosed with high blood pressure, you can work with your doctor to make a long-term plan to manage it. Follow-up care is a key part of your treatment and safety. Be sure to make and go to all appointments, and call your doctor if you are having problems. It's also a good idea to know your test results and keep a list of the medicines you take. How can you care for yourself at home? · Do not smoke. Smoking increases your risk for heart attack and stroke. If you need help quitting, talk to your doctor about stop-smoking programs and medicines. These can increase your chances of quitting for good. · Stay at a healthy weight. · Try to limit how much sodium you eat to less than 2,300 milligrams (mg) a day. Your doctor may ask you to try to eat less than 1,500 mg a day. · Be physically active.  Get at least 30 minutes of exercise on most days of the week. Walking is a good choice. You also may want to do other activities, such as running, swimming, cycling, or playing tennis or team sports. · Avoid or limit alcohol. Talk to your doctor about whether you can drink any alcohol. · Eat plenty of fruits, vegetables, and low-fat dairy products. Eat less saturated and total fats. · Learn how to check your blood pressure at home. When should you call for help? Call your doctor now or seek immediate medical care if:  ? · Your blood pressure is much higher than normal (such as 180/110 or higher). ? · You think high blood pressure is causing symptoms such as:  ¨ Severe headache. ¨ Blurry vision. ? Watch closely for changes in your health, and be sure to contact your doctor if:  ? · You do not get better as expected. Where can you learn more? Go to http://esau-rosa.info/. Enter Q898 in the search box to learn more about \"Elevated Blood Pressure: Care Instructions. \"  Current as of: September 21, 2016  Content Version: 11.4  © 5911-3315 [employer]. Care instructions adapted under license by [employer] (which disclaims liability or warranty for this information). If you have questions about a medical condition or this instruction, always ask your healthcare professional. [address] any warranty or liability for your use of this information.

## 2019-12-25 NOTE — ED PROVIDER NOTES
[facility]  Emergency Department Treatment Report        5:16 PM Barrett Sandoval is a 44 y.o. female with a history of hypertension who presents to ED with an elevated blood pressure patient states that she took her blood pressure was higher than normal she normally runs around 150 and it was 170 today. Patient states that she has no symptoms no no headache no dizziness no blurred vision no weakness no focal weakness reported. No visual disturbances been reported. Patient states she is taking her medications as ordered    No other complaints, associated symptoms or modifying factors at this time. PCP: Rolly Colón NP      The history is provided by the patient. No  was used.         Past Medical History:   Diagnosis Date    Hypercholesterolemia     Hyperlipidemia     Hypertension     Other ill-defined conditions(799.89)     sinus    Other ill-defined conditions(799.89)     hypercholesteral       Past Surgical History:   Procedure Laterality Date    HX HYSTERECTOMY           Family History:   Problem Relation Age of Onset    Diabetes Mother     Diabetes Brother        Social History     Socioeconomic History    Marital status: SINGLE     Spouse name: Not on file    Number of children: Not on file    Years of education: Not on file    Highest education level: Not on file   Occupational History    Not on file   Social Needs    Financial resource strain: Not on file    Food insecurity:     Worry: Not on file     Inability: Not on file    Transportation needs:     Medical: Not on file     Non-medical: Not on file   Tobacco Use    Smoking status: Current Every Day Smoker     Packs/day: 0.50     Types: Cigarettes    Smokeless tobacco: Never Used   Substance and Sexual Activity    Alcohol use: No     Alcohol/week: 0.0 standard drinks    Drug use: Yes     Frequency: 5.0 times per week     Types: Heroin    Sexual activity: Never   Lifestyle    Physical activity: Days per week: Not on file     Minutes per session: Not on file    Stress: Not on file   Relationships    Social connections:     Talks on phone: Not on file     Gets together: Not on file     Attends Rastafarian service: Not on file     Active member of club or organization: Not on file     Attends meetings of clubs or organizations: Not on file     Relationship status: Not on file    Intimate partner violence:     Fear of current or ex partner: Not on file     Emotionally abused: Not on file     Physically abused: Not on file     Forced sexual activity: Not on file   Other Topics Concern    Not on file   Social History Narrative    Not on file         ALLERGIES: Patient has no known allergies. Review of Systems   Constitutional: Negative for fever. HENT: Negative for trouble swallowing. Respiratory: Negative for chest tightness and shortness of breath. Cardiovascular: Negative for chest pain and palpitations. Gastrointestinal: Negative for abdominal pain. Musculoskeletal: Negative for back pain. Neurological: Negative for dizziness, syncope, weakness, light-headedness and numbness. All other systems reviewed and are negative. Vitals:    12/25/19 1628   BP: (!) 181/105   Pulse: 68   Resp: 12   Temp: 99 °F (37.2 °C)   SpO2: 99%   Weight: 72.6 kg (160 lb)   Height: 5' 7\" (1.702 m)            Physical Exam  Vitals signs and nursing note reviewed. Constitutional:       Appearance: She is well-developed. HENT:      Head: Normocephalic and atraumatic. Eyes:      Conjunctiva/sclera: Conjunctivae normal.      Pupils: Pupils are equal, round, and reactive to light. Neck:      Musculoskeletal: Normal range of motion and neck supple. Cardiovascular:      Rate and Rhythm: Normal rate and regular rhythm. Pulmonary:      Effort: Pulmonary effort is normal.      Breath sounds: Normal breath sounds. Abdominal:      General: Bowel sounds are normal.      Palpations: Abdomen is soft. Musculoskeletal: Normal range of motion. Skin:     General: Skin is warm and dry. Neurological:      General: No focal deficit present. Mental Status: She is alert and oriented to person, place, and time. GCS: GCS eye subscore is 4. GCS verbal subscore is 5. GCS motor subscore is 6. Cranial Nerves: Cranial nerves are intact. Sensory: Sensation is intact. Motor: Motor function is intact. Gait: Gait is intact. Deep Tendon Reflexes: Reflexes are normal and symmetric. Psychiatric:         Behavior: Behavior normal.         Thought Content: Thought content normal.         Judgment: Judgment normal.          MDM  Number of Diagnoses or Management Options  Elevated blood pressure reading: established and improving  Diagnosis management comments: Patient stated she felt better she does not want any further work-up she just wants to go home she will take her blood pressure medicine as ordered. Since patient has only had an increase in 20 points from her systolic normal blood pressure I do not see an indication that we need to change her blood pressure medicine at this time. Patient is not having any symptoms there is no headache no weakness no focal weakness no numbness or tingling no dizziness no visual disturbance. Chest pain or shortness of breath. Patient will continue her regular medication and follow-up with her primary care. She is informed to return to the emergency room as needed and if worse.        Amount and/or Complexity of Data Reviewed  Review and summarize past medical records: yes  Independent visualization of images, tracings, or specimens: yes    Risk of Complications, Morbidity, and/or Mortality  Presenting problems: low  Diagnostic procedures: low  Management options: low    Patient Progress  Patient progress: improved         Procedures            Vitals:  Patient Vitals for the past 12 hrs:   Temp Pulse Resp BP SpO2   12/25/19 1628 99 °F (37.2 °C) 68 12 (!) 181/105 99 %            Disposition:    Diagnosis:   1. Elevated blood pressure reading        Disposition: to Home        Follow-up Information     Follow up With Specialties Details Why Contact Info    Blank Bundy NP Nurse Practitioner In 2 days  [address] [facility] [id number]  [phone]             Patient's Medications   Start Taking    No medications on file   Continue Taking    AMLODIPINE (NORVASC) 10 MG TABLET    Take 1 Tab by mouth daily. LISINOPRIL (PRINIVIL, ZESTRIL) 20 MG TABLET    TAKE 1 TABLET BY MOUTH DAILY    METOPROLOL TARTRATE (LOPRESSOR) 50 MG TABLET    TAKE 1 TABLET BY MOUTH TWO TIMES A DAY    TRAZODONE (DESYREL) 150 MG TABLET    TAKE 1 TABLET BY MOUTH EVERY EVENING   These Medications have changed    No medications on file   Stop Taking    No medications on file       Return to the ER if you are unable to obtain referral as directed. Venancio Vaz's  results have been reviewed with her. She has been counseled regarding her diagnosis, treatment, and plan. She verbally conveys understanding and agreement of the signs, symptoms, diagnosis, treatment and prognosis and additionally agrees to follow up as discussed. She also agrees with the care-plan and conveys that all of her questions have been answered. I have also provided discharge instructions for her that include: educational information regarding their diagnosis and treatment, and list of reasons why they would want to return to the ED prior to their follow-up appointment, should her condition change. Yary Tijerina ENP-LUCIAN,FNP-C      Dragon voice recognition was used to generate this report, which may have resulted in some phonetic based errors in grammar and contents.  Even though attempts were made to correct all the mistakes, some may have been missed, and remained in the body of the document

## 2020-01-17 ENCOUNTER — HOSPITAL ENCOUNTER (EMERGENCY)
Age: 50
Discharge: HOME | End: 2020-01-17
Attending: EMERGENCY MEDICINE
Payer: MEDICAID

## 2020-01-17 ENCOUNTER — APPOINTMENT (OUTPATIENT)
Dept: GENERAL RADIOLOGY | Age: 50
End: 2020-01-17
Attending: PHYSICIAN ASSISTANT
Payer: MEDICAID

## 2020-01-17 VITALS
TEMPERATURE: 98.6 F | DIASTOLIC BLOOD PRESSURE: 99 MMHG | HEIGHT: 67 IN | HEART RATE: 76 BPM | SYSTOLIC BLOOD PRESSURE: 165 MMHG | WEIGHT: 170 LBS | OXYGEN SATURATION: 98 % | RESPIRATION RATE: 18 BRPM | BODY MASS INDEX: 26.68 KG/M2

## 2020-01-17 DIAGNOSIS — S16.1XXA STRAIN OF NECK MUSCLE, INITIAL ENCOUNTER: ICD-10-CM
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
## 2020-01-17 DIAGNOSIS — I10 ESSENTIAL HYPERTENSION: Primary | ICD-10-CM

## 2020-01-17 PROCEDURE — 72040 X-RAY EXAM NECK SPINE 2-3 VW: CPT

## 2020-01-17 PROCEDURE — 99284 EMERGENCY DEPT VISIT MOD MDM: CPT

## 2020-01-17 PROCEDURE — 74011250637 HC RX REV CODE- 250/637: Performed by: PHYSICIAN ASSISTANT

## 2020-01-17 RX ORDER — CYCLOBENZAPRINE HCL 10 MG
10 TABLET ORAL
Status: COMPLETED | OUTPATIENT
Start: 2020-01-17 | End: 2020-01-17

## 2020-01-17 RX ORDER — CYCLOBENZAPRINE HCL 5 MG
5 TABLET ORAL
Qty: 9 TAB | Refills: 0 | Status: SHIPPED | OUTPATIENT
Start: 2020-01-17

## 2020-01-17 RX ADMIN — CYCLOBENZAPRINE 10 MG: 10 TABLET, FILM COATED ORAL at 08:57

## 2020-01-17 NOTE — DISCHARGE INSTRUCTIONS
Patient Education        Neck Strain: Care Instructions  Your Care Instructions    You have strained the muscles and ligaments in your neck. A sudden, awkward movement can strain the neck. This often occurs with falls or car accidents or during certain sports. Everyday activities like working on a computer or sleeping can also cause neck strain if they force you to hold your neck in an awkward position for a long time. It is common for neck pain to get worse for a day or two after an injury, but it should start to feel better after that. You may have more pain and stiffness for several days before it gets better. This is expected. It may take a few weeks or longer for it to heal completely. Good home treatment can help you get better faster and avoid future neck problems. Follow-up care is a key part of your treatment and safety. Be sure to make and go to all appointments, and call your doctor if you are having problems. It's also a good idea to know your test results and keep a list of the medicines you take. How can you care for yourself at home? · If you were given a neck brace (cervical collar) to limit neck motion, wear it as instructed for as many days as your doctor tells you to. Do not wear it longer than you were told to. Wearing a brace for too long can make neck stiffness worse and weaken the neck muscles. · You can try using heat or ice to see if it helps. ? Try using a heating pad on a low or medium setting for 15 to 20 minutes every 2 to 3 hours. Try a warm shower in place of one session with the heating pad. You can also buy single-use heat wraps that last up to 8 hours. ? You can also try an ice pack for 10 to 15 minutes every 2 to 3 hours. · Take pain medicines exactly as directed. ? If the doctor gave you a prescription medicine for pain, take it as prescribed. ? If you are not taking a prescription pain medicine, ask your doctor if you can take an over-the-counter medicine.   · Gently rub the area to relieve pain and help with blood flow. Do not massage the area if it hurts to do so. · Do not do anything that makes the pain worse. Take it easy for a couple of days. You can do your usual activities if they do not hurt your neck or put it at risk for more stress or injury. · Try sleeping on a special neck pillow. Place it under your neck, not under your head. Placing a tightly rolled-up towel under your neck while you sleep will also work. If you use a neck pillow or rolled towel, do not use your regular pillow at the same time. · To prevent future neck pain, do exercises to stretch and strengthen your neck and back. Learn how to use good posture, safe lifting techniques, and proper body mechanics. When should you call for help? Call 911 anytime you think you may need emergency care. For example, call if:    · You are unable to move an arm or a leg at all.   [facility] your doctor now or seek immediate medical care if:    · You have new or worse symptoms in your arms, legs, chest, belly, or buttocks. Symptoms may include:  ? Numbness or tingling. ? Weakness. ? Pain.     · You lose bladder or bowel control.    Watch closely for changes in your health, and be sure to contact your doctor if:    · You are not getting better as expected. Where can you learn more? Go to http://esau-rosa.info/. Enter M253 in the search box to learn more about \"Neck Strain: Care Instructions. \"  Current as of: June 26, 2019  Content Version: 12.2  © 0450-2201 [employer]wise[employer]. Care instructions adapted under license by [employer] (which disclaims liability or warranty for this information). If you have questions about a medical condition or this instruction, always ask your healthcare professional. [address] any warranty or liability for your use of this information.
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
## 2021-02-13 NOTE — ED NOTES
Hourly rounding complete. Safety  Pt resting   [x  ]  On stretcher with side rails up and bed in locked position, call bell within reach  [  ]  Sitting in chair with casters locked, call bell within reach    Toileting  [x ] pt denies need to use bathroom  [  ] pt assisted to bathroom  [  ] pt assisted with bedpan  [  ] pt independent to bathroom as needed    Ongoing Plan of Care  Plan of care and expected time for test and results reviewed with pt.     Pain Management / Comfort  [ x ] dimmed lights  [  ] warm blanket provided  [  ] pain assessed  [  ] monitor alarms reviewed BACK PAIN/PAIN
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
## 2022-09-08 ENCOUNTER — TRANSCRIBE ORDER (OUTPATIENT)
Dept: SCHEDULING | Age: 52
End: 2022-09-08

## 2022-09-08 DIAGNOSIS — N18.4 CHRONIC KIDNEY DISEASE, STAGE IV (SEVERE) (HCC): Primary | ICD-10-CM

## 2022-09-08 DIAGNOSIS — R80.9 PROTEINURIA, UNSPECIFIED: ICD-10-CM

## 2022-09-08 DIAGNOSIS — F11.20 HEROIN DEPENDENCE (HCC): ICD-10-CM

## 2022-09-08 DIAGNOSIS — I10 ESSENTIAL (PRIMARY) HYPERTENSION: ICD-10-CM

## 2023-01-25 ENCOUNTER — TRANSCRIBE ORDER (OUTPATIENT)
Dept: SCHEDULING | Age: 53
End: 2023-01-25

## 2023-01-25 DIAGNOSIS — F11.20 HEROIN DEPENDENCE (HCC): ICD-10-CM

## 2023-01-25 DIAGNOSIS — N18.4 CHRONIC KIDNEY DISEASE, STAGE IV (SEVERE) (HCC): Primary | ICD-10-CM

## 2023-01-25 DIAGNOSIS — R80.9 PROTEINURIA: ICD-10-CM

## 2023-01-25 DIAGNOSIS — I10 ESSENTIAL (PRIMARY) HYPERTENSION: ICD-10-CM

## 2023-06-12 NOTE — ROUTINE PROCESS
Report given to [facility], LPN Hydroxyzine Pregnancy And Lactation Text: This medication is not safe during pregnancy and should not be taken. It is also excreted in breast milk and breast feeding isn't recommended.

## 2023-09-13 ENCOUNTER — TRANSCRIBE ORDERS (OUTPATIENT)
Facility: HOSPITAL | Age: 53
End: 2023-09-13

## 2023-09-13 DIAGNOSIS — N18.4 CHRONIC KIDNEY DISEASE, STAGE 4 (SEVERE) (HCC): Primary | ICD-10-CM

## 2023-09-13 DIAGNOSIS — I10 ESSENTIAL HYPERTENSION: ICD-10-CM

## 2023-09-13 DIAGNOSIS — N25.81 HYPERPARATHYROIDISM DUE TO RENAL INSUFFICIENCY (HCC): ICD-10-CM

## 2024-04-09 ENCOUNTER — HOSPITAL ENCOUNTER (OUTPATIENT)
Facility: HOSPITAL | Age: 54
Discharge: HOME | End: 2024-04-12
Attending: INTERNAL MEDICINE
Payer: COMMERCIAL

## 2024-04-09 DIAGNOSIS — I10 ESSENTIAL HYPERTENSION: ICD-10-CM

## 2024-04-09 DIAGNOSIS — N18.4 CHRONIC KIDNEY DISEASE, STAGE 4 (SEVERE) (HCC): ICD-10-CM

## 2024-04-09 DIAGNOSIS — N25.81 HYPERPARATHYROIDISM DUE TO RENAL INSUFFICIENCY (HCC): ICD-10-CM

## 2024-04-09 PROCEDURE — 76770 US EXAM ABDO BACK WALL COMP: CPT

## 2024-06-14 ENCOUNTER — HOSPITAL ENCOUNTER (OUTPATIENT)
Facility: HOSPITAL | Age: 54
End: 2024-06-14
Payer: COMMERCIAL

## 2024-06-14 LAB
ALBUMIN SERPL-MCNC: 3.2 G/DL (ref 3.4–5)
ALBUMIN SERPL-MCNC: 3.3 G/DL (ref 3.4–5)
ALBUMIN/GLOB SERPL: 0.9 (ref 0.8–1.7)
ALBUMIN/GLOB SERPL: 0.9 (ref 0.8–1.7)
ALP SERPL-CCNC: 103 U/L (ref 45–117)
ALP SERPL-CCNC: 90 U/L (ref 45–117)
ALT SERPL-CCNC: 26 U/L (ref 13–56)
ALT SERPL-CCNC: 26 U/L (ref 13–56)
ANION GAP SERPL CALC-SCNC: 11 MMOL/L (ref 3–18)
ANION GAP SERPL CALC-SCNC: 9 MMOL/L (ref 3–18)
AST SERPL-CCNC: 27 U/L (ref 10–38)
AST SERPL-CCNC: 28 U/L (ref 10–38)
BASOPHILS # BLD: 0 K/UL (ref 0–0.1)
BASOPHILS NFR BLD: 0 % (ref 0–2)
BILIRUB SERPL-MCNC: 0.3 MG/DL (ref 0.2–1)
BILIRUB SERPL-MCNC: 0.5 MG/DL (ref 0.2–1)
BUN SERPL-MCNC: 64 MG/DL (ref 7–18)
BUN SERPL-MCNC: 70 MG/DL (ref 7–18)
BUN/CREAT SERPL: 14 (ref 12–20)
BUN/CREAT SERPL: 15 (ref 12–20)
CALCIUM SERPL-MCNC: 8.7 MG/DL (ref 8.5–10.1)
CHLORIDE SERPL-SCNC: 97 MMOL/L (ref 100–111)
CHLORIDE SERPL-SCNC: 99 MMOL/L (ref 100–111)
CHOLEST SERPL-MCNC: 168 MG/DL
CO2 SERPL-SCNC: 26 MMOL/L (ref 21–32)
CO2 SERPL-SCNC: 27 MMOL/L (ref 21–32)
CREAT SERPL-MCNC: 4.67 MG/DL (ref 0.6–1.3)
CREAT SERPL-MCNC: 4.78 MG/DL (ref 0.6–1.3)
CREAT UR-MCNC: 82 MG/DL (ref 30–125)
DIFFERENTIAL METHOD BLD: ABNORMAL
EOSINOPHIL # BLD: 0.1 K/UL (ref 0–0.4)
EOSINOPHIL NFR BLD: 1 % (ref 0–5)
ERYTHROCYTE [DISTWIDTH] IN BLOOD BY AUTOMATED COUNT: 13.2 % (ref 11.6–14.5)
ERYTHROCYTE [DISTWIDTH] IN BLOOD BY AUTOMATED COUNT: 13.3 % (ref 11.6–14.5)
GLOBULIN SER CALC-MCNC: 3.6 G/DL (ref 2–4)
GLOBULIN SER CALC-MCNC: 3.7 G/DL (ref 2–4)
GLUCOSE SERPL-MCNC: 66 MG/DL (ref 74–99)
GLUCOSE SERPL-MCNC: 67 MG/DL (ref 74–99)
HCT VFR BLD AUTO: 27.5 % (ref 35–45)
HCT VFR BLD AUTO: 28.8 % (ref 35–45)
HDLC SERPL-MCNC: 94 MG/DL (ref 40–60)
HDLC SERPL: 1.8 (ref 0–5)
HGB BLD-MCNC: 8.7 G/DL (ref 12–16)
HGB BLD-MCNC: 9.2 G/DL (ref 12–16)
IMM GRANULOCYTES # BLD AUTO: 0 K/UL (ref 0–0.04)
IMM GRANULOCYTES NFR BLD AUTO: 0 % (ref 0–0.5)
LDLC SERPL CALC-MCNC: 59 MG/DL (ref 0–100)
LIPID PANEL: ABNORMAL
LYMPHOCYTES # BLD: 1.9 K/UL (ref 0.9–3.6)
LYMPHOCYTES NFR BLD: 26 % (ref 21–52)
MAGNESIUM SERPL-MCNC: 1.9 MG/DL (ref 1.6–2.6)
MCH RBC QN AUTO: 30.9 PG (ref 24–34)
MCH RBC QN AUTO: 31.1 PG (ref 24–34)
MCHC RBC AUTO-ENTMCNC: 31.6 G/DL (ref 31–37)
MCHC RBC AUTO-ENTMCNC: 31.9 G/DL (ref 31–37)
MCV RBC AUTO: 97.3 FL (ref 78–100)
MCV RBC AUTO: 97.5 FL (ref 78–100)
MICROALBUMIN UR-MCNC: 59.3 MG/DL (ref 0–3)
MICROALBUMIN/CREAT UR-RTO: 723 MG/G (ref 0–30)
MONOCYTES # BLD: 0.5 K/UL (ref 0.05–1.2)
MONOCYTES NFR BLD: 7 % (ref 3–10)
NEUTS SEG # BLD: 4.7 K/UL (ref 1.8–8)
NEUTS SEG NFR BLD: 65 % (ref 40–73)
NRBC # BLD: 0 K/UL (ref 0–0.01)
NRBC # BLD: 0 K/UL (ref 0–0.01)
NRBC BLD-RTO: 0 PER 100 WBC
NRBC BLD-RTO: 0 PER 100 WBC
PHOSPHATE SERPL-MCNC: 4 MG/DL (ref 2.5–4.9)
PLATELET # BLD AUTO: 191 K/UL (ref 135–420)
PLATELET # BLD AUTO: 194 K/UL (ref 135–420)
PMV BLD AUTO: 10.2 FL (ref 9.2–11.8)
PMV BLD AUTO: 10.3 FL (ref 9.2–11.8)
POTASSIUM SERPL-SCNC: 3.2 MMOL/L (ref 3.5–5.5)
POTASSIUM SERPL-SCNC: 3.2 MMOL/L (ref 3.5–5.5)
PROT SERPL-MCNC: 6.8 G/DL (ref 6.4–8.2)
PROT SERPL-MCNC: 7 G/DL (ref 6.4–8.2)
PTH-INTACT SERPL-MCNC: 509.6 PG/ML (ref 18.4–88)
RBC # BLD AUTO: 2.82 M/UL (ref 4.2–5.3)
RBC # BLD AUTO: 2.96 M/UL (ref 4.2–5.3)
RPR SER QL: NONREACTIVE
SODIUM SERPL-SCNC: 134 MMOL/L (ref 136–145)
SODIUM SERPL-SCNC: 135 MMOL/L (ref 136–145)
TRIGL SERPL-MCNC: 75 MG/DL
VLDLC SERPL CALC-MCNC: 15 MG/DL
WBC # BLD AUTO: 7.3 K/UL (ref 4.6–13.2)
WBC # BLD AUTO: 7.3 K/UL (ref 4.6–13.2)

## 2024-06-14 PROCEDURE — 85027 COMPLETE CBC AUTOMATED: CPT

## 2024-06-14 PROCEDURE — 83970 ASSAY OF PARATHORMONE: CPT

## 2024-06-14 PROCEDURE — 82043 UR ALBUMIN QUANTITATIVE: CPT

## 2024-06-14 PROCEDURE — 84100 ASSAY OF PHOSPHORUS: CPT

## 2024-06-14 PROCEDURE — 85025 COMPLETE CBC W/AUTO DIFF WBC: CPT

## 2024-06-14 PROCEDURE — 86592 SYPHILIS TEST NON-TREP QUAL: CPT

## 2024-06-14 PROCEDURE — 80061 LIPID PANEL: CPT

## 2024-06-14 PROCEDURE — 80053 COMPREHEN METABOLIC PANEL: CPT

## 2024-06-14 PROCEDURE — 36415 COLL VENOUS BLD VENIPUNCTURE: CPT

## 2024-06-14 PROCEDURE — 83735 ASSAY OF MAGNESIUM: CPT

## 2024-06-14 PROCEDURE — 82570 ASSAY OF URINE CREATININE: CPT

## 2024-07-01 ENCOUNTER — TELEPHONE (OUTPATIENT)
Age: 54
End: 2024-07-01

## 2024-07-01 NOTE — TELEPHONE ENCOUNTER
Called x2 and LM for Pt to call to schedule consult with Dr. Robert, patient can be worked in for 7/3rd or 7/15th.

## 2024-07-15 ENCOUNTER — ANESTHESIA EVENT (OUTPATIENT)
Facility: HOSPITAL | Age: 54
End: 2024-07-15
Payer: COMMERCIAL

## 2024-07-15 ENCOUNTER — OFFICE VISIT (OUTPATIENT)
Age: 54
End: 2024-07-15
Payer: COMMERCIAL

## 2024-07-15 ENCOUNTER — PREP FOR PROCEDURE (OUTPATIENT)
Age: 54
End: 2024-07-15

## 2024-07-15 VITALS
RESPIRATION RATE: 14 BRPM | HEIGHT: 63 IN | TEMPERATURE: 97.1 F | DIASTOLIC BLOOD PRESSURE: 86 MMHG | SYSTOLIC BLOOD PRESSURE: 138 MMHG | HEART RATE: 79 BPM | BODY MASS INDEX: 28.21 KG/M2 | WEIGHT: 159.2 LBS | OXYGEN SATURATION: 100 %

## 2024-07-15 DIAGNOSIS — N18.6 END STAGE RENAL DISEASE (HCC): ICD-10-CM

## 2024-07-15 DIAGNOSIS — Z99.2 END STAGE RENAL DISEASE ON DIALYSIS (HCC): Primary | ICD-10-CM

## 2024-07-15 DIAGNOSIS — F11.91 HISTORY OF HEROIN USE: ICD-10-CM

## 2024-07-15 DIAGNOSIS — N18.6 END STAGE RENAL DISEASE ON DIALYSIS (HCC): Primary | ICD-10-CM

## 2024-07-15 DIAGNOSIS — F17.200 SMOKER: ICD-10-CM

## 2024-07-15 PROCEDURE — 99204 OFFICE O/P NEW MOD 45 MIN: CPT | Performed by: SURGERY

## 2024-07-15 PROCEDURE — 3079F DIAST BP 80-89 MM HG: CPT | Performed by: SURGERY

## 2024-07-15 PROCEDURE — 3075F SYST BP GE 130 - 139MM HG: CPT | Performed by: SURGERY

## 2024-07-15 ASSESSMENT — ENCOUNTER SYMPTOMS
SHORTNESS OF BREATH: 0
ABDOMINAL PAIN: 0
CHEST TIGHTNESS: 0
ABDOMINAL DISTENTION: 0

## 2024-07-15 NOTE — PROGRESS NOTES
Joanie Rodríguez is a 54 y.o. female (: 1970)     Chief Complaint   Patient presents with    Surgical Consult     PD Cath       Medication list and allergies have been reviewed with Joanie Rodríguez and updated as of today's date.     I have gone over all Medical, Surgical and Social History with Joanie Rodríguez and updated/added the information accordingly.    
https://www.kidney.org/professionals/kdoqi/gfr_calculatorped     These results are not intended for use in patients <18 years of age.     eGFR results are calculated without a race factor using  the 2021 CKD-EPI equation. Careful clinical correlation is recommended, particularly when comparing to results calculated using previous equations.  The CKD-EPI equation is less accurate in patients with extremes of muscle mass, extra-renal metabolism of creatinine, excessive creatine ingestion, or following therapy that affects renal tubular secretion.      Calcium 06/14/2024 8.7  8.5 - 10.1 MG/DL Final    Total Bilirubin 06/14/2024 0.3  0.2 - 1.0 MG/DL Final    ALT 06/14/2024 26  13 - 56 U/L Final    AST 06/14/2024 28  10 - 38 U/L Final    Alk Phosphatase 06/14/2024 103  45 - 117 U/L Final    Total Protein 06/14/2024 7.0  6.4 - 8.2 g/dL Final    Albumin 06/14/2024 3.3 (L)  3.4 - 5.0 g/dL Final    Globulin 06/14/2024 3.7  2.0 - 4.0 g/dL Final    Albumin/Globulin Ratio 06/14/2024 0.9  0.8 - 1.7   Final    Phosphorus 06/14/2024 4.0  2.5 - 4.9 MG/DL Final    Calcium 06/14/2024 8.7  8.5 - 10.1 MG/DL Final    Pth Intact 06/14/2024 509.6 (H)  18.4 - 88.0 pg/mL Final    Microalb, Ur 06/14/2024 59.30 (H)  0 - 3.0 MG/DL Final    Creatinine, Ur 06/14/2024 82.00  30 - 125 mg/dL Final    Microalb/Creat Ratio 06/14/2024 723 (H)  0 - 30 mg/g Final   Hospital Outpatient Visit on 06/14/2024   Component Date Value Ref Range Status    WBC 06/14/2024 7.3  4.6 - 13.2 K/uL Final    RBC 06/14/2024 2.82 (L)  4.20 - 5.30 M/uL Final    Hemoglobin 06/14/2024 8.7 (L)  12.0 - 16.0 g/dL Final    Hematocrit 06/14/2024 27.5 (L)  35.0 - 45.0 % Final    MCV 06/14/2024 97.5  78.0 - 100.0 FL Final    MCH 06/14/2024 30.9  24.0 - 34.0 PG Final    MCHC 06/14/2024 31.6  31.0 - 37.0 g/dL Final    RDW 06/14/2024 13.2  11.6 - 14.5 % Final    Platelets 06/14/2024 194  135 - 420 K/uL Final    MPV 06/14/2024 10.2  9.2 - 11.8 FL Final    Nucleated RBCs 06/14/2024 0.0

## 2024-07-16 ENCOUNTER — ANESTHESIA (OUTPATIENT)
Facility: HOSPITAL | Age: 54
End: 2024-07-16
Payer: COMMERCIAL

## 2024-07-16 ENCOUNTER — HOSPITAL ENCOUNTER (OUTPATIENT)
Facility: HOSPITAL | Age: 54
Setting detail: OUTPATIENT SURGERY
Discharge: HOME | End: 2024-07-16
Attending: SURGERY | Admitting: SURGERY
Payer: COMMERCIAL

## 2024-07-16 VITALS
RESPIRATION RATE: 18 BRPM | HEART RATE: 68 BPM | OXYGEN SATURATION: 100 % | SYSTOLIC BLOOD PRESSURE: 153 MMHG | HEIGHT: 63 IN | WEIGHT: 156.6 LBS | DIASTOLIC BLOOD PRESSURE: 83 MMHG | BODY MASS INDEX: 27.75 KG/M2 | TEMPERATURE: 97.3 F

## 2024-07-16 DIAGNOSIS — N18.6 END STAGE RENAL DISEASE (HCC): Primary | ICD-10-CM

## 2024-07-16 LAB
AMPHET UR QL SCN: NEGATIVE
ANION GAP SERPL CALC-SCNC: 5 MMOL/L (ref 3–18)
BARBITURATES UR QL SCN: NEGATIVE
BENZODIAZ UR QL: NEGATIVE
BUN SERPL-MCNC: 68 MG/DL (ref 7–18)
BUN/CREAT SERPL: 16 (ref 12–20)
CALCIUM SERPL-MCNC: 8.8 MG/DL (ref 8.5–10.1)
CANNABINOIDS UR QL SCN: NEGATIVE
CHLORIDE SERPL-SCNC: 106 MMOL/L (ref 100–111)
CO2 SERPL-SCNC: 29 MMOL/L (ref 21–32)
COCAINE UR QL SCN: NEGATIVE
CREAT SERPL-MCNC: 4.33 MG/DL (ref 0.6–1.3)
EKG ATRIAL RATE: 73 BPM
EKG DIAGNOSIS: NORMAL
EKG P AXIS: 64 DEGREES
EKG P-R INTERVAL: 186 MS
EKG Q-T INTERVAL: 452 MS
EKG QRS DURATION: 90 MS
EKG QTC CALCULATION (BAZETT): 497 MS
EKG R AXIS: 34 DEGREES
EKG T AXIS: 71 DEGREES
EKG VENTRICULAR RATE: 73 BPM
GLUCOSE SERPL-MCNC: 83 MG/DL (ref 74–99)
Lab: ABNORMAL
METHADONE UR QL: POSITIVE
OPIATES UR QL: NEGATIVE
PCP UR QL: NEGATIVE
POTASSIUM SERPL-SCNC: 3.6 MMOL/L (ref 3.5–5.5)
SODIUM SERPL-SCNC: 140 MMOL/L (ref 136–145)

## 2024-07-16 PROCEDURE — 6360000002 HC RX W HCPCS: Performed by: ANESTHESIOLOGY

## 2024-07-16 PROCEDURE — 6360000002 HC RX W HCPCS: Performed by: NURSE ANESTHETIST, CERTIFIED REGISTERED

## 2024-07-16 PROCEDURE — C1750 CATH, HEMODIALYSIS,LONG-TERM: HCPCS | Performed by: SURGERY

## 2024-07-16 PROCEDURE — 7100000001 HC PACU RECOVERY - ADDTL 15 MIN: Performed by: SURGERY

## 2024-07-16 PROCEDURE — 6370000000 HC RX 637 (ALT 250 FOR IP): Performed by: SURGERY

## 2024-07-16 PROCEDURE — 2709999900 HC NON-CHARGEABLE SUPPLY: Performed by: SURGERY

## 2024-07-16 PROCEDURE — 80048 BASIC METABOLIC PNL TOTAL CA: CPT

## 2024-07-16 PROCEDURE — 49324 LAP INSERT TUNNEL IP CATH: CPT | Performed by: SURGERY

## 2024-07-16 PROCEDURE — 3600000002 HC SURGERY LEVEL 2 BASE: Performed by: SURGERY

## 2024-07-16 PROCEDURE — 7100000010 HC PHASE II RECOVERY - FIRST 15 MIN: Performed by: SURGERY

## 2024-07-16 PROCEDURE — 6370000000 HC RX 637 (ALT 250 FOR IP): Performed by: NURSE ANESTHETIST, CERTIFIED REGISTERED

## 2024-07-16 PROCEDURE — 3600000012 HC SURGERY LEVEL 2 ADDTL 15MIN: Performed by: SURGERY

## 2024-07-16 PROCEDURE — 2500000003 HC RX 250 WO HCPCS: Performed by: NURSE ANESTHETIST, CERTIFIED REGISTERED

## 2024-07-16 PROCEDURE — 80307 DRUG TEST PRSMV CHEM ANLYZR: CPT

## 2024-07-16 PROCEDURE — 6360000002 HC RX W HCPCS: Performed by: SURGERY

## 2024-07-16 PROCEDURE — 3700000000 HC ANESTHESIA ATTENDED CARE: Performed by: SURGERY

## 2024-07-16 PROCEDURE — 2500000003 HC RX 250 WO HCPCS: Performed by: SURGERY

## 2024-07-16 PROCEDURE — 2580000003 HC RX 258: Performed by: SURGERY

## 2024-07-16 PROCEDURE — 93010 ELECTROCARDIOGRAM REPORT: CPT | Performed by: INTERNAL MEDICINE

## 2024-07-16 PROCEDURE — 7100000011 HC PHASE II RECOVERY - ADDTL 15 MIN: Performed by: SURGERY

## 2024-07-16 PROCEDURE — 3700000001 HC ADD 15 MINUTES (ANESTHESIA): Performed by: SURGERY

## 2024-07-16 PROCEDURE — 7100000000 HC PACU RECOVERY - FIRST 15 MIN: Performed by: SURGERY

## 2024-07-16 PROCEDURE — 93005 ELECTROCARDIOGRAM TRACING: CPT | Performed by: NURSE ANESTHETIST, CERTIFIED REGISTERED

## 2024-07-16 PROCEDURE — A4217 STERILE WATER/SALINE, 500 ML: HCPCS | Performed by: SURGERY

## 2024-07-16 RX ORDER — FAMOTIDINE 20 MG/1
20 TABLET, FILM COATED ORAL ONCE
Status: COMPLETED | OUTPATIENT
Start: 2024-07-16 | End: 2024-07-16

## 2024-07-16 RX ORDER — SODIUM CHLORIDE 0.9 % (FLUSH) 0.9 %
5-40 SYRINGE (ML) INJECTION PRN
Status: DISCONTINUED | OUTPATIENT
Start: 2024-07-16 | End: 2024-07-16 | Stop reason: HOSPADM

## 2024-07-16 RX ORDER — ONDANSETRON 2 MG/ML
4 INJECTION INTRAMUSCULAR; INTRAVENOUS
Status: COMPLETED | OUTPATIENT
Start: 2024-07-16 | End: 2024-07-16

## 2024-07-16 RX ORDER — HYDROMORPHONE HYDROCHLORIDE 2 MG/ML
0.5 INJECTION, SOLUTION INTRAMUSCULAR; INTRAVENOUS; SUBCUTANEOUS EVERY 5 MIN PRN
Status: DISCONTINUED | OUTPATIENT
Start: 2024-07-16 | End: 2024-07-16 | Stop reason: HOSPADM

## 2024-07-16 RX ORDER — SODIUM CHLORIDE 9 MG/ML
INJECTION, SOLUTION INTRAVENOUS PRN
Status: DISCONTINUED | OUTPATIENT
Start: 2024-07-16 | End: 2024-07-16 | Stop reason: HOSPADM

## 2024-07-16 RX ORDER — SODIUM CHLORIDE 0.9 % (FLUSH) 0.9 %
5-40 SYRINGE (ML) INJECTION EVERY 12 HOURS SCHEDULED
Status: DISCONTINUED | OUTPATIENT
Start: 2024-07-16 | End: 2024-07-16 | Stop reason: HOSPADM

## 2024-07-16 RX ORDER — ONDANSETRON 2 MG/ML
INJECTION INTRAMUSCULAR; INTRAVENOUS PRN
Status: DISCONTINUED | OUTPATIENT
Start: 2024-07-16 | End: 2024-07-16 | Stop reason: SDUPTHER

## 2024-07-16 RX ORDER — DEXAMETHASONE SODIUM PHOSPHATE 4 MG/ML
INJECTION, SOLUTION INTRA-ARTICULAR; INTRALESIONAL; INTRAMUSCULAR; INTRAVENOUS; SOFT TISSUE PRN
Status: DISCONTINUED | OUTPATIENT
Start: 2024-07-16 | End: 2024-07-16 | Stop reason: SDUPTHER

## 2024-07-16 RX ORDER — PROPOFOL 10 MG/ML
INJECTION, EMULSION INTRAVENOUS PRN
Status: DISCONTINUED | OUTPATIENT
Start: 2024-07-16 | End: 2024-07-16 | Stop reason: SDUPTHER

## 2024-07-16 RX ORDER — HYDROCODONE BITARTRATE AND ACETAMINOPHEN 5; 325 MG/1; MG/1
1 TABLET ORAL ONCE AS NEEDED
Status: COMPLETED | OUTPATIENT
Start: 2024-07-16 | End: 2024-07-16

## 2024-07-16 RX ORDER — SUCCINYLCHOLINE/SOD CL,ISO/PF 100 MG/5ML
SYRINGE (ML) INTRAVENOUS PRN
Status: DISCONTINUED | OUTPATIENT
Start: 2024-07-16 | End: 2024-07-16 | Stop reason: SDUPTHER

## 2024-07-16 RX ORDER — LIDOCAINE HYDROCHLORIDE 10 MG/ML
1 INJECTION, SOLUTION EPIDURAL; INFILTRATION; INTRACAUDAL; PERINEURAL
Status: DISCONTINUED | OUTPATIENT
Start: 2024-07-16 | End: 2024-07-16 | Stop reason: HOSPADM

## 2024-07-16 RX ORDER — ROCURONIUM BROMIDE 10 MG/ML
INJECTION, SOLUTION INTRAVENOUS PRN
Status: DISCONTINUED | OUTPATIENT
Start: 2024-07-16 | End: 2024-07-16 | Stop reason: SDUPTHER

## 2024-07-16 RX ORDER — SODIUM CHLORIDE, SODIUM LACTATE, POTASSIUM CHLORIDE, CALCIUM CHLORIDE 600; 310; 30; 20 MG/100ML; MG/100ML; MG/100ML; MG/100ML
INJECTION, SOLUTION INTRAVENOUS CONTINUOUS
Status: DISCONTINUED | OUTPATIENT
Start: 2024-07-16 | End: 2024-07-16 | Stop reason: HOSPADM

## 2024-07-16 RX ORDER — HYDROCODONE BITARTRATE AND ACETAMINOPHEN 5; 325 MG/1; MG/1
1 TABLET ORAL EVERY 4 HOURS PRN
Qty: 18 TABLET | Refills: 0 | Status: SHIPPED | OUTPATIENT
Start: 2024-07-16 | End: 2024-07-19

## 2024-07-16 RX ORDER — SODIUM CHLORIDE 9 MG/ML
INJECTION, SOLUTION INTRAVENOUS CONTINUOUS
Status: DISCONTINUED | OUTPATIENT
Start: 2024-07-16 | End: 2024-07-16 | Stop reason: HOSPADM

## 2024-07-16 RX ORDER — MIDAZOLAM HYDROCHLORIDE 1 MG/ML
INJECTION INTRAMUSCULAR; INTRAVENOUS PRN
Status: DISCONTINUED | OUTPATIENT
Start: 2024-07-16 | End: 2024-07-16 | Stop reason: SDUPTHER

## 2024-07-16 RX ORDER — FENTANYL CITRATE 50 UG/ML
25 INJECTION, SOLUTION INTRAMUSCULAR; INTRAVENOUS EVERY 5 MIN PRN
Status: COMPLETED | OUTPATIENT
Start: 2024-07-16 | End: 2024-07-16

## 2024-07-16 RX ORDER — LIDOCAINE HYDROCHLORIDE 20 MG/ML
INJECTION, SOLUTION EPIDURAL; INFILTRATION; INTRACAUDAL; PERINEURAL PRN
Status: DISCONTINUED | OUTPATIENT
Start: 2024-07-16 | End: 2024-07-16 | Stop reason: SDUPTHER

## 2024-07-16 RX ORDER — FENTANYL CITRATE 50 UG/ML
INJECTION, SOLUTION INTRAMUSCULAR; INTRAVENOUS PRN
Status: DISCONTINUED | OUTPATIENT
Start: 2024-07-16 | End: 2024-07-16 | Stop reason: SDUPTHER

## 2024-07-16 RX ADMIN — ONDANSETRON 4 MG: 2 INJECTION INTRAMUSCULAR; INTRAVENOUS at 12:00

## 2024-07-16 RX ADMIN — SODIUM CHLORIDE: 9 INJECTION, SOLUTION INTRAVENOUS at 11:40

## 2024-07-16 RX ADMIN — HYDROMORPHONE HYDROCHLORIDE 0.5 MG: 2 INJECTION INTRAMUSCULAR; INTRAVENOUS; SUBCUTANEOUS at 13:20

## 2024-07-16 RX ADMIN — MIDAZOLAM 1 MG: 1 INJECTION, SOLUTION INTRAMUSCULAR; INTRAVENOUS at 11:40

## 2024-07-16 RX ADMIN — LIDOCAINE HYDROCHLORIDE 100 MG: 20 INJECTION, SOLUTION EPIDURAL; INFILTRATION; INTRACAUDAL; PERINEURAL at 11:45

## 2024-07-16 RX ADMIN — SUGAMMADEX 200 MG: 100 INJECTION, SOLUTION INTRAVENOUS at 12:09

## 2024-07-16 RX ADMIN — HYDROCODONE BITARTRATE AND ACETAMINOPHEN 1 TABLET: 5; 325 TABLET ORAL at 14:43

## 2024-07-16 RX ADMIN — ROCURONIUM BROMIDE 10 MG: 10 INJECTION, SOLUTION INTRAVENOUS at 11:55

## 2024-07-16 RX ADMIN — FENTANYL CITRATE 25 MCG: 50 INJECTION INTRAMUSCULAR; INTRAVENOUS at 12:55

## 2024-07-16 RX ADMIN — FENTANYL CITRATE 25 MCG: 50 INJECTION INTRAMUSCULAR; INTRAVENOUS at 12:50

## 2024-07-16 RX ADMIN — FENTANYL CITRATE 50 MCG: 50 INJECTION INTRAMUSCULAR; INTRAVENOUS at 11:45

## 2024-07-16 RX ADMIN — ROCURONIUM BROMIDE 5 MG: 10 INJECTION, SOLUTION INTRAVENOUS at 11:45

## 2024-07-16 RX ADMIN — FENTANYL CITRATE 25 MCG: 50 INJECTION INTRAMUSCULAR; INTRAVENOUS at 12:45

## 2024-07-16 RX ADMIN — MIDAZOLAM 1 MG: 1 INJECTION, SOLUTION INTRAMUSCULAR; INTRAVENOUS at 11:43

## 2024-07-16 RX ADMIN — WATER 2000 MG: 1 INJECTION, SOLUTION INTRAMUSCULAR; INTRAVENOUS; SUBCUTANEOUS at 11:49

## 2024-07-16 RX ADMIN — DEXAMETHASONE SODIUM PHOSPHATE 4 MG: 4 INJECTION INTRA-ARTICULAR; INTRALESIONAL; INTRAMUSCULAR; INTRAVENOUS; SOFT TISSUE at 12:00

## 2024-07-16 RX ADMIN — Medication 100 MG: at 11:45

## 2024-07-16 RX ADMIN — FAMOTIDINE 20 MG: 20 TABLET ORAL at 09:54

## 2024-07-16 RX ADMIN — PROPOFOL 150 MG: 10 INJECTION, EMULSION INTRAVENOUS at 11:45

## 2024-07-16 RX ADMIN — ONDANSETRON 4 MG: 2 INJECTION INTRAMUSCULAR; INTRAVENOUS at 13:05

## 2024-07-16 RX ADMIN — FENTANYL CITRATE 25 MCG: 50 INJECTION INTRAMUSCULAR; INTRAVENOUS at 13:00

## 2024-07-16 ASSESSMENT — PAIN DESCRIPTION - DESCRIPTORS
DESCRIPTORS: ACHING

## 2024-07-16 ASSESSMENT — PAIN DESCRIPTION - LOCATION
LOCATION: ABDOMEN

## 2024-07-16 ASSESSMENT — PAIN SCALES - GENERAL
PAINLEVEL_OUTOF10: 5
PAINLEVEL_OUTOF10: 0
PAINLEVEL_OUTOF10: 6
PAINLEVEL_OUTOF10: 5
PAINLEVEL_OUTOF10: 4
PAINLEVEL_OUTOF10: 0
PAINLEVEL_OUTOF10: 7

## 2024-07-16 ASSESSMENT — PAIN DESCRIPTION - ORIENTATION
ORIENTATION: MID
ORIENTATION: LEFT;LOWER;ANTERIOR
ORIENTATION: MID
ORIENTATION: LEFT;LOWER;ANTERIOR
ORIENTATION: LEFT;LOWER;ANTERIOR

## 2024-07-16 ASSESSMENT — PAIN DESCRIPTION - PAIN TYPE: TYPE: SURGICAL PAIN

## 2024-07-16 ASSESSMENT — PAIN - FUNCTIONAL ASSESSMENT
PAIN_FUNCTIONAL_ASSESSMENT: 0-10
PAIN_FUNCTIONAL_ASSESSMENT: ACTIVITIES ARE NOT PREVENTED
PAIN_FUNCTIONAL_ASSESSMENT: ACTIVITIES ARE NOT PREVENTED

## 2024-07-16 NOTE — ANESTHESIA POSTPROCEDURE EVALUATION
Department of Anesthesiology  Postprocedure Note    Patient: Joanie Rodríguez  MRN: [id number]  YOB: 1970  Date of evaluation: 7/16/2024    Procedure Summary       Date: 07/16/24 Room / Location: [facility] MAIN 01 / [facility] MAIN OR    Anesthesia Start: 1140 Anesthesia Stop: 1225    Procedure: LAPAROSCOPIC PERITONEAL DIALYSIS CATHETER (Abdomen) Diagnosis:       End stage renal disease (HCC)      (End stage renal disease (HCC) [N18.6])    Surgeons: Nora Robert DO Responsible Provider: Diane James MD    Anesthesia Type: General ASA Status: 4            Anesthesia Type: General    Mazin Phase I: Mazin Score: 9    Mazin Phase II: Mazin Score: 10    Anesthesia Post Evaluation    Patient location during evaluation: PACU  Patient participation: complete - patient participated  Level of consciousness: awake and alert  Pain score: 3  Airway patency: patent  Nausea & Vomiting: no nausea and no vomiting  Cardiovascular status: hemodynamically stable  Respiratory status: acceptable  Hydration status: euvolemic  Multimodal analgesia pain management approach  Pain management: adequate    No notable events documented.

## 2024-07-16 NOTE — INTERVAL H&P NOTE
Update History & Physical    The patient's History and Physical of 7/16/2024   was reviewed with the patient and I examined the patient. There was no change. The surgical site was confirmed by the patient and me.     Plan: The risks, benefits, expected outcome, and alternative to the recommended procedure have been discussed with the patient. Patient understands and wants to proceed with the procedure.     Electronically signed by Nora Robert DO on 7/16/2024 at 10:46 AM

## 2024-07-16 NOTE — ANESTHESIA PRE PROCEDURE
Department of Anesthesiology  Preprocedure Note       Name:  Joanie Rodríguez   Age:  54 y.o.  :  1970                                          MRN:  [id number]         Date:  2024      Surgeon: Surgeon(s):  Nora Robert DO    Procedure: Procedure(s):  LAPAROSCOPIC PERITONEAL DIALYSIS CATHETER; C-ARM    Medications prior to admission:   Prior to Admission medications    Medication Sig Start Date End Date Taking? Authorizing Provider   amLODIPine (NORVASC) 10 MG tablet Take 1 tablet by mouth daily 18   Automatic Reconciliation, Ar   cyclobenzaprine (FLEXERIL) 5 MG tablet Take 1 tablet by mouth 20   Automatic Reconciliation, Ar   lisinopril (PRINIVIL;ZESTRIL) 20 MG tablet TAKE 1 TABLET BY MOUTH DAILY 19   Automatic Reconciliation, Ar   metoprolol tartrate (LOPRESSOR) 50 MG tablet TAKE 1 TABLET BY MOUTH TWO TIMES A DAY 19   Automatic Reconciliation, Ar   traZODone (DESYREL) 150 MG tablet TAKE 1 TABLET BY MOUTH EVERY EVENING  Patient not taking: Reported on 7/15/2024 12/3/19   Automatic Reconciliation, Ar       Current medications:    Current Facility-Administered Medications   Medication Dose Route Frequency Provider Last Rate Last Admin   • lidocaine PF 1 % injection 1 mL  1 mL IntraDERmal Once PRN Kasia Fong APRN - CRNA       • lactated ringers IV soln infusion   IntraVENous Continuous HetalKasia keane APRN - CRNA       • sodium chloride flush 0.9 % injection 5-40 mL  5-40 mL IntraVENous PRN Kasia Fong APRN - CRNA       • sodium chloride flush 0.9 % injection 5-40 mL  5-40 mL IntraVENous 2 times per day Yessylvainokin, Nora, DO       • sodium chloride flush 0.9 % injection 5-40 mL  5-40 mL IntraVENous PRN Yescelestinain, Nora, DO       • 0.9 % sodium chloride infusion   IntraVENous PRN Yessylvainokin Nora, DO       • ceFAZolin (ANCEF) 2,000 mg in sterile water 20 mL IV syringe  2,000 mg IntraVENous On Call to OR Nora Robert, DO           Allergies:  No Known

## 2024-07-16 NOTE — OP NOTE
Operative Note      Patient: Joanie Rodríguez  YOB: 1970  MRN: [id number]    Date of Procedure: 7/16/2024    Pre-Op Diagnosis Codes:     * End stage renal disease (HCC) [N18.6]    Post-Op Diagnosis: Same       Procedure(s):  LAPAROSCOPIC PERITONEAL DIALYSIS CATHETER    Surgeon(s):  Nora Robert DO    Assistant:   Surgical Assistant: Amanda Marion    Anesthesia: General    Estimated Blood Loss (mL): Minimal    Complications: None    Specimens:   * No specimens in log *    Implants:  * No implants in log *      Drains: * No LDAs found *    Findings:  Infection Present At Time Of Surgery (PATOS) (choose all levels that have infection present):  No infection present  Other Findings: see dictation     Detailed Description of Procedure:   After informed consent was obtained the patient was taken to the operating room and placed in the supine position. General anesthesia was administered and titrated to effect. The abdomen was prepped and draped in the usual sterile fashion and a time out procedure was performed. Next using an 11 blade scalpel a 5mm incision was made superior to the umbilicus and a 5mm trocar was inserted. Next the laparoscope was inserted. There were no intraabdominal adhesions. The patient was placed in slight trendelenberg position. We then made a 5mm incision to the left of the umbilicus and used the tunneling device to create a track along the peritoneum and then hernandez the peritoneum deep in the pelvis. The catheter was then inserted through the peel back sheath which was then removed. The tip of the catheter remained deep in the pelvis centrally.  We then created a counter incision on the abdominal wall and tunneled the second cuff and tubing in the subcutaneous tissue. 500cc of saline was then instilled in the abdomen via the catheter and was then placed to a gravity bag with good return of fluid. Pneumoperitoneum was then discontinued and the trocar was removed. Incisions were

## 2024-07-16 NOTE — PERIOP NOTE
Patient /Family /Designee has been informed that [facility] is not responsible for patient belongings per policy and the signed [facility] Patient Agreement document.  Personal items should be sent home or checked in with security.  Patient /Family /Designee selected the following action:                            []  Send personal items home with a family member or friend                                                 []  Check in personal items with security, excluding clothing                            [x]  Maintain personal items at the bedside, against recommendation                                 by Santhosh Stallworth [facility]

## 2024-07-16 NOTE — DISCHARGE INSTRUCTIONS
Post Operative Discharge Instructions    No driving for 24 hours after surgery and off of prescription pain medication.    Avoid activities that bump or cause jarring movements at the surgical site for 10 days.    No lifting more than 10-15 pounds for 6 weeks after surgery or until cleared for activity at your follow up.    Walking is encouraged after surgery.    Stairs are ok to climb.      DIET:    Diet as tolerated. Start with liquids then advance your diet based on how you fell.    No alcoholic beverages for 24 hours after surgery or while on antibiotics or pain mdications.    Drink plenty of water.        MEDICATIONS:    Use daily stool softners (over the counter such as Colace or Senekot) while on pain medications.     Resume pre-operative medications. If you are on any blood thinners see special instructions below.    Use prescriptions given or Tylenol, Ibuprofen as needed for pain.    Do not use more than 4000mg of Tylenol (acetaminophen) per day. Be aware this may be  in your prescription medication as well.    Be aware narcotic prescriptions are tightly controlled in the [facility]. If requiring more than one refill, a follow up appointment will be required.      WOUND CARE:      You have skin glue on your incisions, you may shower in 24 hours and pat dry. Glue will fall off on it's own.    Do not tub bathe, swim, or soak incisions until cleared to do so at your follow up.    Ice bag to the affected area; 20 minutes on and 20 minutes off if desired.      FOLLOW UP CARE:    You should have an appointment scheduled within 14 days after surgery. If this is not yet scheduled, call the office.  Any forms that you need filled out regarding your medical care can be brought to the office at follow up appointment of faxed to: [phone]        CALL DOCTOR IF:  Temperature is over 101 degrees, a slight fever can be normal 24-48 hour after surgery.  Nausea & vomiting that persists more than 24 hours after

## 2024-07-19 ENCOUNTER — TELEPHONE (OUTPATIENT)
Age: 54
End: 2024-07-19

## 2024-07-19 ENCOUNTER — TELEPHONE (OUTPATIENT)
Facility: HOSPITAL | Age: 54
End: 2024-07-19

## 2024-07-19 DIAGNOSIS — N18.6 END STAGE RENAL DISEASE (HCC): Primary | ICD-10-CM

## 2024-07-19 RX ORDER — HYDROCODONE BITARTRATE AND ACETAMINOPHEN 5; 325 MG/1; MG/1
1 TABLET ORAL EVERY 6 HOURS PRN
Qty: 12 TABLET | Refills: 0 | Status: SHIPPED | OUTPATIENT
Start: 2024-07-19 | End: 2024-07-22

## 2024-07-30 ENCOUNTER — HOSPITAL ENCOUNTER (OUTPATIENT)
Facility: HOSPITAL | Age: 54
Discharge: HOME | End: 2024-08-02
Payer: COMMERCIAL

## 2024-07-30 DIAGNOSIS — I10 ESSENTIAL HYPERTENSION, MALIGNANT: ICD-10-CM

## 2024-07-30 DIAGNOSIS — R80.9 PROTEINURIA, UNSPECIFIED TYPE: ICD-10-CM

## 2024-07-30 DIAGNOSIS — N18.6 END STAGE RENAL DISEASE (HCC): ICD-10-CM

## 2024-07-30 DIAGNOSIS — N25.81 SECONDARY HYPERPARATHYROIDISM OF RENAL ORIGIN (HCC): ICD-10-CM

## 2024-07-30 PROCEDURE — 71046 X-RAY EXAM CHEST 2 VIEWS: CPT

## 2024-11-07 ENCOUNTER — TRANSCRIBE ORDERS (OUTPATIENT)
Facility: HOSPITAL | Age: 54
End: 2024-11-07

## 2024-11-07 ENCOUNTER — HOSPITAL ENCOUNTER (OUTPATIENT)
Facility: HOSPITAL | Age: 54
Discharge: HOME | End: 2024-11-10
Payer: COMMERCIAL

## 2024-11-07 DIAGNOSIS — E56.9 MULTIPLE VITAMIN DEFICIENCY DISEASE: ICD-10-CM

## 2024-11-07 DIAGNOSIS — N18.6 END STAGE RENAL DISEASE (HCC): Primary | ICD-10-CM

## 2024-11-07 DIAGNOSIS — D50.9 IRON DEFICIENCY ANEMIA, UNSPECIFIED IRON DEFICIENCY ANEMIA TYPE: ICD-10-CM

## 2024-11-07 DIAGNOSIS — N18.6 END STAGE RENAL DISEASE (HCC): ICD-10-CM

## 2024-11-07 LAB
25(OH)D3 SERPL-MCNC: 75.3 NG/ML (ref 30–100)
ALBUMIN SERPL-MCNC: 2.4 G/DL (ref 3.4–5)
ALBUMIN/GLOB SERPL: 0.7 (ref 0.8–1.7)
ALP SERPL-CCNC: 89 U/L (ref 45–117)
ALT SERPL-CCNC: 21 U/L (ref 13–56)
ANION GAP SERPL CALC-SCNC: 10 MMOL/L (ref 3–18)
AST SERPL-CCNC: 24 U/L (ref 10–38)
BASOPHILS # BLD: 0 K/UL (ref 0–0.1)
BASOPHILS NFR BLD: 1 % (ref 0–2)
BILIRUB SERPL-MCNC: 0.3 MG/DL (ref 0.2–1)
BUN SERPL-MCNC: 54 MG/DL (ref 7–18)
BUN/CREAT SERPL: 9 (ref 12–20)
CALCIUM SERPL-MCNC: 8.4 MG/DL (ref 8.5–10.1)
CALCIUM SERPL-MCNC: 8.5 MG/DL (ref 8.5–10.1)
CHLORIDE SERPL-SCNC: 100 MMOL/L (ref 100–111)
CO2 SERPL-SCNC: 27 MMOL/L (ref 21–32)
CREAT SERPL-MCNC: 5.69 MG/DL (ref 0.6–1.3)
DIFFERENTIAL METHOD BLD: ABNORMAL
EOSINOPHIL # BLD: 0.1 K/UL (ref 0–0.4)
EOSINOPHIL NFR BLD: 1 % (ref 0–5)
ERYTHROCYTE [DISTWIDTH] IN BLOOD BY AUTOMATED COUNT: 13.3 % (ref 11.6–14.5)
FERRITIN SERPL-MCNC: 24 NG/ML (ref 8–388)
GLOBULIN SER CALC-MCNC: 3.3 G/DL (ref 2–4)
GLUCOSE SERPL-MCNC: 105 MG/DL (ref 74–99)
HCT VFR BLD AUTO: 28.9 % (ref 35–45)
HGB BLD-MCNC: 9 G/DL (ref 12–16)
IMM GRANULOCYTES # BLD AUTO: 0 K/UL (ref 0–0.04)
IMM GRANULOCYTES NFR BLD AUTO: 0 % (ref 0–0.5)
IRON SATN MFR SERPL: 19 % (ref 20–50)
IRON SERPL-MCNC: 46 UG/DL (ref 50–175)
LYMPHOCYTES # BLD: 1.3 K/UL (ref 0.9–3.6)
LYMPHOCYTES NFR BLD: 21 % (ref 21–52)
MCH RBC QN AUTO: 30.2 PG (ref 24–34)
MCHC RBC AUTO-ENTMCNC: 31.1 G/DL (ref 31–37)
MCV RBC AUTO: 97 FL (ref 78–100)
MONOCYTES # BLD: 0.4 K/UL (ref 0.05–1.2)
MONOCYTES NFR BLD: 7 % (ref 3–10)
NEUTS SEG # BLD: 4.2 K/UL (ref 1.8–8)
NEUTS SEG NFR BLD: 70 % (ref 40–73)
NRBC # BLD: 0 K/UL (ref 0–0.01)
NRBC BLD-RTO: 0 PER 100 WBC
PHOSPHATE SERPL-MCNC: 5.7 MG/DL (ref 2.5–4.9)
PLATELET # BLD AUTO: 211 K/UL (ref 135–420)
PMV BLD AUTO: 10.1 FL (ref 9.2–11.8)
POTASSIUM SERPL-SCNC: 3.6 MMOL/L (ref 3.5–5.5)
PROT SERPL-MCNC: 5.7 G/DL (ref 6.4–8.2)
PTH-INTACT SERPL-MCNC: 962.5 PG/ML (ref 18.4–88)
RBC # BLD AUTO: 2.98 M/UL (ref 4.2–5.3)
SODIUM SERPL-SCNC: 137 MMOL/L (ref 136–145)
TIBC SERPL-MCNC: 237 UG/DL (ref 250–450)
WBC # BLD AUTO: 6 K/UL (ref 4.6–13.2)

## 2024-11-07 PROCEDURE — 86317 IMMUNOASSAY INFECTIOUS AGENT: CPT

## 2024-11-07 PROCEDURE — 87340 HEPATITIS B SURFACE AG IA: CPT

## 2024-11-07 PROCEDURE — 83550 IRON BINDING TEST: CPT

## 2024-11-07 PROCEDURE — 80053 COMPREHEN METABOLIC PANEL: CPT

## 2024-11-07 PROCEDURE — 86704 HEP B CORE ANTIBODY TOTAL: CPT

## 2024-11-07 PROCEDURE — 36415 COLL VENOUS BLD VENIPUNCTURE: CPT

## 2024-11-07 PROCEDURE — 85025 COMPLETE CBC W/AUTO DIFF WBC: CPT

## 2024-11-07 PROCEDURE — 82728 ASSAY OF FERRITIN: CPT

## 2024-11-07 PROCEDURE — 87389 HIV-1 AG W/HIV-1&-2 AB AG IA: CPT

## 2024-11-07 PROCEDURE — 84100 ASSAY OF PHOSPHORUS: CPT

## 2024-11-07 PROCEDURE — 83540 ASSAY OF IRON: CPT

## 2024-11-07 PROCEDURE — 83970 ASSAY OF PARATHORMONE: CPT

## 2024-11-07 PROCEDURE — 82306 VITAMIN D 25 HYDROXY: CPT

## 2024-11-08 LAB
HBV CORE AB SERPL QL IA: NEGATIVE
HBV SURFACE AB SER-ACNC: <3.5 MIU/ML
HBV SURFACE AG SER QL: <0.1 INDEX
HBV SURFACE AG SER QL: NEGATIVE
HIV 1+2 AB+HIV1 P24 AG SERPL QL IA: NONREACTIVE
HIV 1/2 RESULT COMMENT: NORMAL

## 2025-01-29 ENCOUNTER — HOSPITAL ENCOUNTER (OUTPATIENT)
Facility: HOSPITAL | Age: 55
Setting detail: SPECIMEN
Discharge: HOME | End: 2025-02-01

## 2025-01-29 LAB — LABCORP SPECIMEN COLLECTION: NORMAL

## 2025-01-29 PROCEDURE — 99001 SPECIMEN HANDLING PT-LAB: CPT

## (undated) DEVICE — ADAPTER DLYS TI LOK ADLOK 2 PC

## (undated) DEVICE — Z DISCONTINUED USE 2220147 SUTURE VCRL SZ 0 L27IN ABSRB UD L26MM CT-2 1/2 CIR J270H

## (undated) DEVICE — BLADE CLIPPER GEN PURP NS

## (undated) DEVICE — GLOVE SURG SZ 6 CRM LTX FREE POLYISOPRENE POLYMER BEAD ANTI

## (undated) DEVICE — Device

## (undated) DEVICE — TRAY PREP DRY W/ PREM GLV 2 APPL 6 SPNG 2 UNDPD 1 OVERWRAP

## (undated) DEVICE — Z DISCONTINUED USE 2220241 SUTURE SZ 0 27IN 5/8 CIR UR-6  TAPER PT VIOLET ABSRB VICRYL J603H

## (undated) DEVICE — SUTURE MONOCRYL SZ 4-0 L18IN ABSRB UD L19MM PS-2 3/8 CIR PRIM Y496G

## (undated) DEVICE — SET TRNSF 1.6ML L4IN EXT LIFE W/ XSH TWST CLMP FEM LOK CONN

## (undated) DEVICE — SOLUTION IRRIG 1000ML 0.9% SOD CHL USP POUR PLAS BTL

## (undated) DEVICE — INTENDED FOR TISSUE SEPARATION, AND OTHER PROCEDURES THAT REQUIRE A SHARP SURGICAL BLADE TO PUNCTURE OR CUT.: Brand: BARD-PARKER SAFETY BLADES SIZE 10, STERILE

## (undated) DEVICE — KIT OR TURNOVER

## (undated) DEVICE — CAP DLYS PLAS MINICAP W/ POVIDONE IOD SOL DC

## (undated) DEVICE — TROCAR: Brand: KII® SLEEVE

## (undated) DEVICE — GLOVE SURG SZ 65 L12IN FNGR THK79MIL GRN LTX FREE

## (undated) DEVICE — SOLUTION IRRIG 1000ML H2O STRL BLT

## (undated) DEVICE — 2, DISPOSABLE SUCTION/IRRIGATOR WITH DISPOSABLE TIP: Brand: STRYKEFLOW

## (undated) DEVICE — CATHETER PERI DLYS AD L57CM DIA15FR DBL CUF COILED LIN